# Patient Record
Sex: FEMALE | Race: WHITE | URBAN - METROPOLITAN AREA
[De-identification: names, ages, dates, MRNs, and addresses within clinical notes are randomized per-mention and may not be internally consistent; named-entity substitution may affect disease eponyms.]

---

## 2017-01-01 ENCOUNTER — APPOINTMENT (RX ONLY)
Dept: URBAN - METROPOLITAN AREA CLINIC 23 | Facility: CLINIC | Age: 62
Setting detail: DERMATOLOGY
End: 2017-01-01

## 2017-01-01 DIAGNOSIS — D485 NEOPLASM OF UNCERTAIN BEHAVIOR OF SKIN: ICD-10-CM

## 2017-01-01 DIAGNOSIS — L82.1 OTHER SEBORRHEIC KERATOSIS: ICD-10-CM

## 2017-01-01 DIAGNOSIS — L81.4 OTHER MELANIN HYPERPIGMENTATION: ICD-10-CM

## 2017-01-01 DIAGNOSIS — L98.8 OTHER SPECIFIED DISORDERS OF THE SKIN AND SUBCUTANEOUS TISSUE: ICD-10-CM

## 2017-01-01 DIAGNOSIS — Z85.828 PERSONAL HISTORY OF OTHER MALIGNANT NEOPLASM OF SKIN: ICD-10-CM

## 2017-01-01 PROCEDURE — ? BIOPSY BY PUNCH METHOD

## 2017-01-01 PROCEDURE — 17262 DSTRJ MAL LES T/A/L 1.1-2.0: CPT

## 2017-01-01 PROCEDURE — ? PRESCRIPTION

## 2017-01-01 PROCEDURE — ? CURETTAGE AND DESTRUCTION

## 2017-01-01 PROCEDURE — ? BIOPSY BY SHAVE METHOD

## 2017-01-01 PROCEDURE — 40490 BIOPSY OF LIP: CPT

## 2017-01-01 PROCEDURE — 99214 OFFICE O/P EST MOD 30 MIN: CPT | Mod: 25

## 2017-01-01 PROCEDURE — ? COUNSELING

## 2017-01-01 PROCEDURE — 11100: CPT

## 2017-01-01 PROCEDURE — ? DEFER

## 2017-01-01 RX ORDER — AMOXICILLIN 500 MG/1
CAPSULE ORAL
Qty: 8 | Refills: 0 | Status: ERX | COMMUNITY
Start: 2017-01-01

## 2017-01-01 RX ADMIN — AMOXICILLIN: 500 CAPSULE ORAL at 12:42

## 2017-01-01 ASSESSMENT — LOCATION DETAILED DESCRIPTION DERM
LOCATION DETAILED: LEFT DISTAL PRETIBIAL REGION
LOCATION DETAILED: RIGHT INFERIOR VERMILION LIP
LOCATION DETAILED: LEFT PROXIMAL DORSAL FOREARM
LOCATION DETAILED: RIGHT INFERIOR VERMILION LIP
LOCATION DETAILED: RIGHT POSTERIOR ANKLE
LOCATION DETAILED: RIGHT PROXIMAL RADIAL DORSAL FOREARM
LOCATION DETAILED: INFERIOR THORACIC SPINE
LOCATION DETAILED: RIGHT POSTERIOR SHOULDER
LOCATION DETAILED: RIGHT ANTERIOR PROXIMAL THIGH
LOCATION DETAILED: UPPER STERNUM
LOCATION DETAILED: LEFT DISTAL CALF
LOCATION DETAILED: LEFT POSTERIOR SHOULDER
LOCATION DETAILED: LEFT SUPERIOR UPPER BACK

## 2017-01-01 ASSESSMENT — LOCATION SIMPLE DESCRIPTION DERM
LOCATION SIMPLE: LEFT CALF
LOCATION SIMPLE: RIGHT SHOULDER
LOCATION SIMPLE: UPPER BACK
LOCATION SIMPLE: CHEST
LOCATION SIMPLE: RIGHT LIP
LOCATION SIMPLE: RIGHT FOREARM
LOCATION SIMPLE: RIGHT ANKLE
LOCATION SIMPLE: LEFT PRETIBIAL REGION
LOCATION SIMPLE: RIGHT THIGH
LOCATION SIMPLE: LEFT SHOULDER
LOCATION SIMPLE: RIGHT LIP
LOCATION SIMPLE: LEFT FOREARM
LOCATION SIMPLE: LEFT UPPER BACK

## 2017-01-01 ASSESSMENT — LOCATION ZONE DERM
LOCATION ZONE: ARM
LOCATION ZONE: LIP
LOCATION ZONE: TRUNK
LOCATION ZONE: LIP
LOCATION ZONE: LEG

## 2017-02-04 ENCOUNTER — HOSPITAL ENCOUNTER (INPATIENT)
Age: 62
LOS: 3 days | Discharge: HOME | DRG: 871 | End: 2017-02-07
Attending: EMERGENCY MEDICINE | Admitting: INTERNAL MEDICINE
Payer: COMMERCIAL

## 2017-02-04 ENCOUNTER — APPOINTMENT (OUTPATIENT)
Dept: GENERAL RADIOLOGY | Age: 62
DRG: 871 | End: 2017-02-04
Attending: INTERNAL MEDICINE
Payer: COMMERCIAL

## 2017-02-04 DIAGNOSIS — E86.0 DEHYDRATION: ICD-10-CM

## 2017-02-04 DIAGNOSIS — N10 ACUTE PYELONEPHRITIS: ICD-10-CM

## 2017-02-04 DIAGNOSIS — A41.9 SEPSIS, DUE TO UNSPECIFIED ORGANISM: Primary | ICD-10-CM

## 2017-02-04 DIAGNOSIS — N17.9 AKI (ACUTE KIDNEY INJURY) (HCC): ICD-10-CM

## 2017-02-04 PROBLEM — N39.0 SEPSIS DUE TO URINARY TRACT INFECTION (HCC): Status: ACTIVE | Noted: 2017-02-04

## 2017-02-04 LAB
ALBUMIN SERPL BCP-MCNC: 3 G/DL (ref 3.2–4.6)
ALBUMIN/GLOB SERPL: 0.7 {RATIO} (ref 1.2–3.5)
ALP SERPL-CCNC: 93 U/L (ref 50–136)
ALT SERPL-CCNC: 27 U/L (ref 12–65)
ANION GAP BLD CALC-SCNC: 8 MMOL/L (ref 7–16)
AST SERPL W P-5'-P-CCNC: 41 U/L (ref 15–37)
ATRIAL RATE: 65 BPM
BACTERIA URNS QL MICRO: ABNORMAL /HPF
BILIRUB SERPL-MCNC: 0.9 MG/DL (ref 0.2–1.1)
BNP SERPL-MCNC: 353 PG/ML
BUN SERPL-MCNC: 25 MG/DL (ref 8–23)
CALCIUM SERPL-MCNC: 9.6 MG/DL (ref 8.3–10.4)
CALCULATED P AXIS, ECG09: 36 DEGREES
CALCULATED R AXIS, ECG10: 63 DEGREES
CALCULATED T AXIS, ECG11: 76 DEGREES
CASTS URNS QL MICRO: ABNORMAL /LPF
CHLORIDE SERPL-SCNC: 102 MMOL/L (ref 98–107)
CO2 SERPL-SCNC: 28 MMOL/L (ref 21–32)
CREAT SERPL-MCNC: 1.47 MG/DL (ref 0.6–1)
DEPRECATED S PYO AG THROAT QL EIA: POSITIVE
DIAGNOSIS, 93000: NORMAL
DIASTOLIC BP, ECG02: NORMAL MMHG
DIFFERENTIAL METHOD BLD: ABNORMAL
EPI CELLS #/AREA URNS HPF: ABNORMAL /HPF
ERYTHROCYTE [DISTWIDTH] IN BLOOD BY AUTOMATED COUNT: 13.1 % (ref 11.9–14.6)
FLUAV AG NPH QL IA: NEGATIVE
FLUBV AG NPH QL IA: NEGATIVE
GLOBULIN SER CALC-MCNC: 4.3 G/DL (ref 2.3–3.5)
GLUCOSE SERPL-MCNC: 122 MG/DL (ref 65–100)
HCT VFR BLD AUTO: 37.4 % (ref 35.8–46.3)
HGB BLD-MCNC: 12.2 G/DL (ref 11.7–15.4)
LACTATE BLD-SCNC: 1.4 MMOL/L (ref 0.5–1.9)
LYMPHOCYTES # BLD: 0.3 K/UL (ref 0.5–4.6)
LYMPHOCYTES NFR BLD MANUAL: 3 % (ref 16–44)
MAGNESIUM SERPL-MCNC: 0.9 MG/DL (ref 1.8–2.4)
MCH RBC QN AUTO: 30.2 PG (ref 26.1–32.9)
MCHC RBC AUTO-ENTMCNC: 32.6 G/DL (ref 31.4–35)
MCV RBC AUTO: 92.6 FL (ref 79.6–97.8)
MONOCYTES # BLD: 0.9 K/UL (ref 0.1–1.3)
MONOCYTES NFR BLD MANUAL: 8 % (ref 3–9)
NEUTS BAND NFR BLD MANUAL: 3 % (ref 0–10)
NEUTS SEG # BLD: 9.5 K/UL (ref 1.7–8.2)
NEUTS SEG NFR BLD MANUAL: 86 % (ref 47–75)
P-R INTERVAL, ECG05: 216 MS
PLATELET # BLD AUTO: 150 K/UL (ref 150–450)
PLATELET COMMENTS,PCOM: ADEQUATE
PMV BLD AUTO: 10.4 FL (ref 10.8–14.1)
POTASSIUM SERPL-SCNC: 4.3 MMOL/L (ref 3.5–5.1)
PROT SERPL-MCNC: 7.3 G/DL (ref 6.3–8.2)
Q-T INTERVAL, ECG07: 414 MS
QRS DURATION, ECG06: 90 MS
QTC CALCULATION (BEZET), ECG08: 430 MS
RBC # BLD AUTO: 4.04 M/UL (ref 4.05–5.25)
RBC #/AREA URNS HPF: ABNORMAL /HPF
RBC MORPH BLD: ABNORMAL
SODIUM SERPL-SCNC: 138 MMOL/L (ref 136–145)
SYSTOLIC BP, ECG01: NORMAL MMHG
VENTRICULAR RATE, ECG03: 65 BPM
WBC # BLD AUTO: 10.7 K/UL (ref 4.3–11.1)
WBC URNS QL MICRO: >100 /HPF

## 2017-02-04 PROCEDURE — 93005 ELECTROCARDIOGRAM TRACING: CPT | Performed by: EMERGENCY MEDICINE

## 2017-02-04 PROCEDURE — 83605 ASSAY OF LACTIC ACID: CPT

## 2017-02-04 PROCEDURE — 83880 ASSAY OF NATRIURETIC PEPTIDE: CPT | Performed by: INTERNAL MEDICINE

## 2017-02-04 PROCEDURE — 74011000258 HC RX REV CODE- 258: Performed by: EMERGENCY MEDICINE

## 2017-02-04 PROCEDURE — 74011250637 HC RX REV CODE- 250/637: Performed by: EMERGENCY MEDICINE

## 2017-02-04 PROCEDURE — 74011250636 HC RX REV CODE- 250/636: Performed by: INTERNAL MEDICINE

## 2017-02-04 PROCEDURE — 83735 ASSAY OF MAGNESIUM: CPT | Performed by: INTERNAL MEDICINE

## 2017-02-04 PROCEDURE — 71010 XR CHEST SNGL V: CPT

## 2017-02-04 PROCEDURE — 77010033678 HC OXYGEN DAILY

## 2017-02-04 PROCEDURE — 94760 N-INVAS EAR/PLS OXIMETRY 1: CPT

## 2017-02-04 PROCEDURE — 93005 ELECTROCARDIOGRAM TRACING: CPT | Performed by: INTERNAL MEDICINE

## 2017-02-04 PROCEDURE — 94640 AIRWAY INHALATION TREATMENT: CPT

## 2017-02-04 PROCEDURE — 87186 SC STD MICRODIL/AGAR DIL: CPT | Performed by: EMERGENCY MEDICINE

## 2017-02-04 PROCEDURE — 65270000029 HC RM PRIVATE

## 2017-02-04 PROCEDURE — 80053 COMPREHEN METABOLIC PANEL: CPT | Performed by: EMERGENCY MEDICINE

## 2017-02-04 PROCEDURE — 87804 INFLUENZA ASSAY W/OPTIC: CPT | Performed by: INTERNAL MEDICINE

## 2017-02-04 PROCEDURE — 81003 URINALYSIS AUTO W/O SCOPE: CPT | Performed by: EMERGENCY MEDICINE

## 2017-02-04 PROCEDURE — 87086 URINE CULTURE/COLONY COUNT: CPT | Performed by: EMERGENCY MEDICINE

## 2017-02-04 PROCEDURE — 74011000250 HC RX REV CODE- 250: Performed by: INTERNAL MEDICINE

## 2017-02-04 PROCEDURE — 74011250637 HC RX REV CODE- 250/637: Performed by: INTERNAL MEDICINE

## 2017-02-04 PROCEDURE — 51701 INSERT BLADDER CATHETER: CPT | Performed by: EMERGENCY MEDICINE

## 2017-02-04 PROCEDURE — 74011250636 HC RX REV CODE- 250/636: Performed by: EMERGENCY MEDICINE

## 2017-02-04 PROCEDURE — 81015 MICROSCOPIC EXAM OF URINE: CPT | Performed by: EMERGENCY MEDICINE

## 2017-02-04 PROCEDURE — 99285 EMERGENCY DEPT VISIT HI MDM: CPT | Performed by: EMERGENCY MEDICINE

## 2017-02-04 PROCEDURE — 87088 URINE BACTERIA CULTURE: CPT | Performed by: EMERGENCY MEDICINE

## 2017-02-04 PROCEDURE — 87040 BLOOD CULTURE FOR BACTERIA: CPT | Performed by: EMERGENCY MEDICINE

## 2017-02-04 PROCEDURE — 85025 COMPLETE CBC W/AUTO DIFF WBC: CPT | Performed by: EMERGENCY MEDICINE

## 2017-02-04 PROCEDURE — 96365 THER/PROPH/DIAG IV INF INIT: CPT | Performed by: EMERGENCY MEDICINE

## 2017-02-04 PROCEDURE — 77030011943

## 2017-02-04 PROCEDURE — 87880 STREP A ASSAY W/OPTIC: CPT | Performed by: INTERNAL MEDICINE

## 2017-02-04 RX ORDER — MAGNESIUM SULFATE HEPTAHYDRATE 40 MG/ML
4 INJECTION, SOLUTION INTRAVENOUS ONCE
Status: COMPLETED | OUTPATIENT
Start: 2017-02-04 | End: 2017-02-04

## 2017-02-04 RX ORDER — LORAZEPAM 1 MG/1
1 TABLET ORAL
Status: DISCONTINUED | OUTPATIENT
Start: 2017-02-04 | End: 2017-02-07 | Stop reason: HOSPADM

## 2017-02-04 RX ORDER — SODIUM CHLORIDE 0.9 % (FLUSH) 0.9 %
5-10 SYRINGE (ML) INJECTION AS NEEDED
Status: DISCONTINUED | OUTPATIENT
Start: 2017-02-04 | End: 2017-02-07 | Stop reason: HOSPADM

## 2017-02-04 RX ORDER — HYDROCODONE BITARTRATE AND ACETAMINOPHEN 5; 325 MG/1; MG/1
1 TABLET ORAL
Status: DISCONTINUED | OUTPATIENT
Start: 2017-02-04 | End: 2017-02-05

## 2017-02-04 RX ORDER — SODIUM CHLORIDE 9 MG/ML
100 INJECTION, SOLUTION INTRAVENOUS CONTINUOUS
Status: DISCONTINUED | OUTPATIENT
Start: 2017-02-04 | End: 2017-02-04

## 2017-02-04 RX ORDER — PREGABALIN 100 MG/1
200 CAPSULE ORAL 3 TIMES DAILY
Status: DISCONTINUED | OUTPATIENT
Start: 2017-02-04 | End: 2017-02-04

## 2017-02-04 RX ORDER — SOTALOL HYDROCHLORIDE 80 MG/1
160 TABLET ORAL 2 TIMES DAILY
Status: DISCONTINUED | OUTPATIENT
Start: 2017-02-04 | End: 2017-02-07 | Stop reason: HOSPADM

## 2017-02-04 RX ORDER — ACETAMINOPHEN 650 MG/1
975 SUPPOSITORY RECTAL
Status: COMPLETED | OUTPATIENT
Start: 2017-02-04 | End: 2017-02-04

## 2017-02-04 RX ORDER — IPRATROPIUM BROMIDE AND ALBUTEROL SULFATE 2.5; .5 MG/3ML; MG/3ML
3 SOLUTION RESPIRATORY (INHALATION)
Status: DISCONTINUED | OUTPATIENT
Start: 2017-02-04 | End: 2017-02-07 | Stop reason: HOSPADM

## 2017-02-04 RX ORDER — ATORVASTATIN CALCIUM 10 MG/1
20 TABLET, FILM COATED ORAL
Status: DISCONTINUED | OUTPATIENT
Start: 2017-02-04 | End: 2017-02-07 | Stop reason: HOSPADM

## 2017-02-04 RX ORDER — IPRATROPIUM BROMIDE AND ALBUTEROL SULFATE 2.5; .5 MG/3ML; MG/3ML
3 SOLUTION RESPIRATORY (INHALATION)
Status: DISCONTINUED | OUTPATIENT
Start: 2017-02-04 | End: 2017-02-04

## 2017-02-04 RX ORDER — FUROSEMIDE 10 MG/ML
20 INJECTION INTRAMUSCULAR; INTRAVENOUS EVERY 12 HOURS
Status: DISCONTINUED | OUTPATIENT
Start: 2017-02-04 | End: 2017-02-04

## 2017-02-04 RX ORDER — LOSARTAN POTASSIUM 50 MG/1
100 TABLET ORAL DAILY
Status: DISCONTINUED | OUTPATIENT
Start: 2017-02-05 | End: 2017-02-07 | Stop reason: HOSPADM

## 2017-02-04 RX ORDER — DULOXETIN HYDROCHLORIDE 60 MG/1
60 CAPSULE, DELAYED RELEASE ORAL DAILY
Status: DISCONTINUED | OUTPATIENT
Start: 2017-02-05 | End: 2017-02-07 | Stop reason: HOSPADM

## 2017-02-04 RX ORDER — PREGABALIN 100 MG/1
200 CAPSULE ORAL 3 TIMES DAILY
Status: DISCONTINUED | OUTPATIENT
Start: 2017-02-05 | End: 2017-02-07 | Stop reason: HOSPADM

## 2017-02-04 RX ADMIN — CEFTRIAXONE 1 G: 1 INJECTION, POWDER, FOR SOLUTION INTRAMUSCULAR; INTRAVENOUS at 09:05

## 2017-02-04 RX ADMIN — SODIUM CHLORIDE 100 ML/HR: 900 INJECTION, SOLUTION INTRAVENOUS at 14:57

## 2017-02-04 RX ADMIN — LORAZEPAM 1 MG: 1 TABLET ORAL at 22:09

## 2017-02-04 RX ADMIN — MAGNESIUM SULFATE HEPTAHYDRATE 4 G: 40 INJECTION, SOLUTION INTRAVENOUS at 14:56

## 2017-02-04 RX ADMIN — SOTALOL HYDROCHLORIDE 160 MG: 80 TABLET ORAL at 17:57

## 2017-02-04 RX ADMIN — ACETAMINOPHEN 975 MG: 650 SUPPOSITORY RECTAL at 09:05

## 2017-02-04 RX ADMIN — SODIUM CHLORIDE 3417 ML: 900 INJECTION, SOLUTION INTRAVENOUS at 12:00

## 2017-02-04 RX ADMIN — HYDROCODONE BITARTRATE AND ACETAMINOPHEN 1 TABLET: 5; 325 TABLET ORAL at 20:42

## 2017-02-04 RX ADMIN — SODIUM CHLORIDE 3417 ML: 900 INJECTION, SOLUTION INTRAVENOUS at 09:05

## 2017-02-04 RX ADMIN — ATORVASTATIN CALCIUM 20 MG: 10 TABLET, FILM COATED ORAL at 22:09

## 2017-02-04 RX ADMIN — AZITHROMYCIN MONOHYDRATE 500 MG: 500 INJECTION, POWDER, LYOPHILIZED, FOR SOLUTION INTRAVENOUS at 18:10

## 2017-02-04 RX ADMIN — IPRATROPIUM BROMIDE AND ALBUTEROL SULFATE 3 ML: 2.5; .5 SOLUTION RESPIRATORY (INHALATION) at 23:15

## 2017-02-04 RX ADMIN — IPRATROPIUM BROMIDE AND ALBUTEROL SULFATE 3 ML: 2.5; .5 SOLUTION RESPIRATORY (INHALATION) at 20:29

## 2017-02-04 NOTE — ED NOTES
Attempted to give report, was told no nurses were available. Gino 97 page for code blue on 8th floor so I will call back.

## 2017-02-04 NOTE — H&P
HOSPITALIST HISTORY AND PHYSICAL  NAME:  Cristine Kennedy   Age:  57 y.o.  :   1955   MRN:   [id number]  PCP: Dave Ralph MD  Consulting MD:  Treatment Team: Attending Provider: Saniya Heart MD; Primary Nurse: Yessy Austin    REASON FOR ADMISSION: UTI, acute renal insufficiency, concern for sepsis, hypoxic resp failure    HPI:   55yr old female with a pmhx sig for HTN and recurrent utis. Has been feeling unwell since thursday with a sore throat. Felt better Friday so did not go to the doctor. Last night weak with nausea and decreased po intake. This morning family brought her to the er were she was noted to be febrile >101 and acute renal insufficiency also has a uti. Hospitalist asked to admit    Pt mobile and walks independently at baseline    Complete ROS done and is as stated in HPI or otherwise negative  Past Medical History   Diagnosis Date    Arrhythmia      Hx of A Fib, s/p ablation for treatment    Arthritis      osteo knees    Atrial fibrillation ([facility] [address] 4/15/2013     :Hx of successful atrial fibrillation and atrial flutter ablation on2011. Patient is chronic Sotalol therapy.  Autoimmune disease ([facility] [address]      fibrmyalgia    Chronic pain      d/t fibromyalgia    Dyspepsia 4/15/2013    Fibromyalgia 4/15/2013    Hyperlipidemia 4/15/2013    Hypertension      controlled by medication    Nausea & vomiting     Obesity      BMI=42    Obesity     Panic disorder 4/15/2013    Psychiatric disorder      anxiety controlled by medication      Past Surgical History   Procedure Laterality Date    Hx lap cholecystectomy      Hx tubal ligation      Hx tonsillectomy      Pr cardiac surg procedure unlist  2011     ablation    Hx knee arthroscopy       left meniscus tear repair      Prior to Admission Medications   Prescriptions Last Dose Informant Patient Reported? Taking?    Cholecalciferol, Vitamin D3, (VITAMIN D3) 1,000 unit cap   Yes No   Sig: Take by mouth. DULoxetine (CYMBALTA) 30 mg capsule   No No   Sig: TAKE THREE CAPSULES BY MOUTH DAILY   ESOMEPRAZOLE MAGNESIUM (NEXIUM PO)   Yes No   Sig: Take  by mouth. HYDROcodone-acetaminophen (NORCO) 7.5-325 mg per tablet   Yes No   Sig: Take 1 Tab by mouth every six (6) hours as needed. Per anesthesia protocol:instructed to take am of surgery if needed   LORazepam (ATIVAN) 1 mg tablet   No No   Sig: Take 1 Tab by mouth every eight (8) hours as needed for Anxiety. atorvastatin (LIPITOR) 20 mg tablet   No No   Sig: Take 1 Tab by mouth nightly. cyanocobalamin (VITAMIN B-12) 1,000 mcg Subl   Yes No   Si Tab by SubLINGual route every morning. losartan (COZAAR) 100 mg tablet   No No   Sig: Take 1 Tab by mouth daily. meloxicam (MOBIC) 15 mg tablet   No No   Sig: TAKE 1 TABLET EVERY DAY   omega-3 fatty acids-vitamin e (FISH OIL) 1,000 mg cap   Yes No   Sig: Take 1 Cap by mouth.   pregabalin (LYRICA) 200 mg capsule   No No   Sig: Take 1 Cap by mouth three (3) times daily. Max Daily Amount: 600 mg.   sotalol (BETAPACE) 160 mg tablet   No No   Sig: Take 1 Tab by mouth two (2) times a day. tapentadol (NUCYNTA) 100 mg tablet   Yes No   Sig: Take  by mouth.       Facility-Administered Medications: None     Allergies   Allergen Reactions    Ace Inhibitors Angioedema    Augmentin [Amoxicillin-Pot Clavulanate] Diarrhea and Nausea Only    Reglan [Metoclopramide] Anxiety    Trilipix [Fenofibric Acid (Choline)] Not Reported This Time      Social History   Substance Use Topics    Smoking status: Never Smoker    Smokeless tobacco: Never Used    Alcohol use No      Family History   Problem Relation Age of Onset    Heart Disease Mother     Cancer Father      prostate    Cancer Brother      brain tumor    Cancer Brother      brain tumor    Cancer Brother      pancreatic    Cancer Sister      breast    Breast Cancer Sister 79      Objective:     Visit Vitals    /70    Pulse 73    Temp 99.4 °F (37.4 °C)  Resp 17    Ht 5' 6\" (1.676 m)    Wt 113.9 kg (251 lb)    SpO2 96%    BMI 40.51 kg/m2      Temp (24hrs), Av.5 °F (38.6 °C), Min:99.4 °F (37.4 °C), Max:103.7 °F (39.8 °C)    Oxygen Therapy  O2 Sat (%): 96 % (17)  O2 Device: Nasal cannula (17)  O2 Flow Rate (L/min): 4 l/min (17)  Physical Exam:  General:    Lethargic, drowsy and weak,    Head:   Normocephalic, without obvious abnormality, atraumatic. Nose:  Nares normal. No drainage or sinus tenderness. Lungs:   Bilateral crackles but pt to weak to accommodate the full exam  Heart:   Regular rate and rhythm,  no murmur, rub or gallop. Abdomen:   Soft, non-tender. Not distended. Bowel sounds normal.   Extremities: No cyanosis. No edema. No clubbing  Skin:     Texture, turgor normal. No rashes or lesions. Not Jaundiced  Neurologic: Lethargic but arousable &  oriented x 3, no focal deficits   Data Review: personally by me   Recent Results (from the past 24 hour(s))   CBC WITH AUTOMATED DIFF    Collection Time: 17  8:22 AM   Result Value Ref Range    WBC 10.7 4.3 - 11.1 K/uL    RBC 4.04 (L) 4.05 - 5.25 M/uL    HGB 12.2 11.7 - 15.4 g/dL    HCT 37.4 35.8 - 46.3 %    MCV 92.6 79.6 - 97.8 FL    MCH 30.2 26.1 - 32.9 PG    MCHC 32.6 31.4 - 35.0 g/dL    RDW 13.1 11.9 - 14.6 %    PLATELET 346 720 - 919 K/uL    MPV 10.4 (L) 10.8 - 14.1 FL    NEUTROPHILS 86 (H) 47 - 75 %    BAND NEUTROPHILS 3 0 - 10 %    LYMPHOCYTES 3 (L) 16 - 44 %    MONOCYTES 8 3 - 9 %    ABS. NEUTROPHILS 9.5 (H) 1.7 - 8.2 K/UL    ABS. LYMPHOCYTES 0.3 (L) 0.5 - 4.6 K/UL    ABS.  MONOCYTES 0.9 0.1 - 1.3 K/UL    RBC COMMENTS NORMOCYTIC/NORMOCHROMIC      PLATELET COMMENTS ADEQUATE      DF MANUAL     METABOLIC PANEL, COMPREHENSIVE    Collection Time: 17  8:22 AM   Result Value Ref Range    Sodium 138 136 - 145 mmol/L    Potassium 4.3 3.5 - 5.1 mmol/L    Chloride 102 98 - 107 mmol/L    CO2 28 21 - 32 mmol/L    Anion gap 8 7 - 16 mmol/L    Glucose 122 (H) 65 - 100 mg/dL    BUN 25 (H) 8 - 23 MG/DL    Creatinine 1.47 (H) 0.6 - 1.0 MG/DL    GFR est AA 46 (L) >60 ml/min/1.73m2    GFR est non-AA 38 (L) >60 ml/min/1.73m2    Calcium 9.6 8.3 - 10.4 MG/DL    Bilirubin, total 0.9 0.2 - 1.1 MG/DL    ALT (SGPT) 27 12 - 65 U/L    AST (SGOT) 41 (H) 15 - 37 U/L    Alk. phosphatase 93 50 - 136 U/L    Protein, total 7.3 6.3 - 8.2 g/dL    Albumin 3.0 (L) 3.2 - 4.6 g/dL    Globulin 4.3 (H) 2.3 - 3.5 g/dL    A-G Ratio 0.7 (L) 1.2 - 3.5     POC LACTIC ACID    Collection Time: 02/04/17  8:25 AM   Result Value Ref Range    Lactic Acid (POC) 1.4 0.5 - 1.9 mmol/L   URINE MICROSCOPIC    Collection Time: 02/04/17  8:46 AM   Result Value Ref Range    WBC >100 (H) 0 /hpf    RBC 0-3 0 /hpf    Epithelial cells 0-3 0 /hpf    Bacteria 4+ (H) 0 /hpf    Casts 0-3 0 /lpf     Imaging /Procedures /Studies reviewed personally by me  XR Results (most recent):  No results found for this or any previous visit. CT Scan      VAS/US Results (most recent):  No results found for this or any previous visit. Assessment and Plan: Active Hospital Problems    Diagnosis Date Noted    Sepsis due to urinary tract infection ([facility] [address] 02/04/2017       PLAN  ·  Concern for sepsis - pt clinically dehydrated with temp of >101, uti, resp failure and acute renal insufficiency - will give supportive care as per sepsis protocol  · UTI- continue abx started in er and follow up cultures  · Sore throat- concern for strep- will swab  · Hypoxic resp failure- pt not on home O2 on 4l here will continue and try to wean down   · cxr - ordered by er but pending to evaluate for PNA  · HTN- monitor BP and give prn meds as needed.    · Further mgt as her clinical course dictates  · Pt high risk given resp. failure, uti and concern for sepsis    Code Status:   full  Anticipated discharge:   2-3 days      Signed By: Cj Rain MD     February 4, 2017      Pt allowed me to sit her up- wheezing bliaterally  cxr shows multifocal pneumonia

## 2017-02-04 NOTE — PROGRESS NOTES
Pt arrived to the floor alert and oriented x 3, pt very drowsy but awakes to name and touch, resp even and unlabored pt lung sounds with crackles doctor made aware , skin dry and intact. will continue to monitor.

## 2017-02-04 NOTE — ED PROVIDER NOTES
HPI Comments: Had cough and nasal congestion for several days worsening Thursday,     mumbling speech and decreased responsiveness started last night    Patient is a 57 y.o. female presenting with altered mental status. The history is provided by the spouse and a relative. Altered mental status    This is a new problem. The current episode started 12 to 24 hours ago. The problem has been gradually worsening. Associated symptoms include confusion and somnolence. Mental status baseline is normal.  Risk factors include a recent illness. Past medical history comments: frequent UTIs. Past Medical History:   Diagnosis Date    Arrhythmia 2009     Hx of A Fib, s/p ablation for treatment    Arthritis      osteo knees    Atrial fibrillation ([facility] [address] 4/15/2013     :Hx of successful atrial fibrillation and atrial flutter ablation on2-7-2011. Patient is chronic Sotalol therapy.     Autoimmune disease ([facility] [address]      fibrmyalgia    Chronic pain      d/t fibromyalgia    Dyspepsia 4/15/2013    Fibromyalgia 4/15/2013    Hyperlipidemia 4/15/2013    Hypertension      controlled by medication    Nausea & vomiting     Obesity      BMI=42    Obesity     Panic disorder 4/15/2013    Psychiatric disorder      anxiety controlled by medication       Past Surgical History:   Procedure Laterality Date    Hx lap cholecystectomy  2003    Hx tubal ligation  1988    Hx tonsillectomy  1976    Pr cardiac surg procedure unlist  2/8/2011     ablation    Hx knee arthroscopy  2008     left meniscus tear repair         Family History:   Problem Relation Age of Onset    Heart Disease Mother     Cancer Father      prostate    Cancer Brother      brain tumor    Cancer Brother      brain tumor    Cancer Brother      pancreatic    Cancer Sister      breast    Breast Cancer Sister 79       Social History     Social History    Marital status:      Spouse name: N/A    Number of children: N/A    Years of education: N/A Occupational History    Not on file. Social History Main Topics    Smoking status: Never Smoker    Smokeless tobacco: Never Used    Alcohol use No    Drug use: No    Sexual activity: Not on file     Other Topics Concern    Not on file     Social History Narrative         ALLERGIES: Ace inhibitors; Augmentin [amoxicillin-pot clavulanate]; Reglan [metoclopramide]; and Trilipix [fenofibric acid (choline)]    Review of Systems   Unable to perform ROS: Mental status change   Constitutional: Positive for activity change, appetite change, fatigue and fever. Negative for chills. HENT: Positive for congestion. Negative for rhinorrhea and sore throat. Eyes: Negative for discharge and redness. Respiratory: Positive for cough. Negative for shortness of breath. Cardiovascular: Negative for chest pain. Gastrointestinal: Positive for abdominal pain. Negative for diarrhea, nausea and vomiting. Musculoskeletal: Negative for arthralgias and back pain. Skin: Negative for rash. Neurological: Negative for dizziness and headaches. Psychiatric/Behavioral: Positive for confusion. Vitals:    02/04/17 0819 02/04/17 0855   BP: 155/72    Pulse: 82    Resp: 20    Temp: (!) 101.4 °F (38.6 °C) (!) 103.7 °F (39.8 °C)   SpO2: (!) 86%    Weight: 113.9 kg (251 lb)    Height: 5' 6\" (1.676 m)             Physical Exam   Constitutional: She appears well-developed and well-nourished. She appears distressed. Mumbles yes/no answers, to questions   HENT:   Head: Normocephalic and atraumatic. Eyes: Conjunctivae are normal. Pupils are equal, round, and reactive to light. Right eye exhibits no discharge. Left eye exhibits no discharge. No scleral icterus. Neck: Normal range of motion. Neck supple. Cardiovascular: Normal rate, regular rhythm and normal heart sounds. Exam reveals no gallop. No murmur heard. Pulmonary/Chest: Effort normal and breath sounds normal. No respiratory distress. She has no wheezes. She has no rales. Abdominal: Soft. Bowel sounds are normal. There is no tenderness. There is no guarding. Musculoskeletal: Normal range of motion. She exhibits no edema. Neurological: She is alert. She exhibits normal muscle tone. cni 2-12 grossly   Skin: Skin is warm and dry. She is not diaphoretic. Psychiatric: She has a normal mood and affect. Her behavior is normal.   Nursing note and vitals reviewed. MDM  Number of Diagnoses or Management Options  Acute pyelonephritis: new and requires workup  ESTELLE (acute kidney injury) ([facility] [address]): new and requires workup  Dehydration:   Sepsis, due to unspecified organism [facility]):   Diagnosis management comments: Medical decision making note: Altered mental status with fever, patient has UTI). Lactic acid and white count are okay,    Catheter urine shows nitrite positive, greater than 100 white cells and 4+ bacteria  This concludes the \"medical decision making note\" part of this emergency department visit note.          Amount and/or Complexity of Data Reviewed  Tests in the medicine section of CPT®: ordered and reviewed (Results Include:    Recent Results (from the past 24 hour(s))  -CBC WITH AUTOMATED DIFF  Collection Time: 02/04/17  8:22 AM       Result                                            Value                         Ref Range                       WBC                                               10.7                          4.3 - 11.1 K/uL                 RBC                                               4.04 (L)                      4.05 - 5.25 M/uL                HGB                                               12.2                          11.7 - 15.4 g/dL                HCT                                               37.4                          35.8 - 46.3 %                   MCV                                               92.6                          79.6 - 97.8 FL                  MCH                                               30.2 26.1 - 32.9 PG                  MCHC                                              32.6                          31.4 - 35.0 g/dL                RDW                                               13.1                          11.9 - 14.6 %                   PLATELET                                          150                           150 - 450 K/uL                  MPV                                               10.4 (L)                      10.8 - 14.1 FL                  NEUTROPHILS                                       86 (H)                        47 - 75 %                       BAND NEUTROPHILS                                  3                             0 - 10 %                        LYMPHOCYTES                                       3 (L)                         16 - 44 %                       MONOCYTES                                         8                             3 - 9 %                         ABS. NEUTROPHILS                                  9.5 (H)                       1.7 - 8.2 K/UL                  ABS.  LYMPHOCYTES                                  0.3 (L)                       0.5 - 4.6 K/UL                  ABS. MONOCYTES                                    0.9                           0.1 - 1.3 K/UL                  RBC COMMENTS                                      NORMOCYTIC/NORMOCHROMIC                                       PLATELET COMMENTS                                 ADEQUATE                                                      DF                                                MANUAL                                                   -METABOLIC PANEL, COMPREHENSIVE  Collection Time: 02/04/17  8:22 AM       Result                                            Value                         Ref Range                       Sodium                                            138                           136 - 145 mmol/L                Potassium 4.3                           3.5 - 5.1 mmol/L                Chloride                                          102                           98 - 107 mmol/L                 CO2                                               28                            21 - 32 mmol/L                  Anion gap                                         8                             7 - 16 mmol/L                   Glucose                                           122 (H)                       65 - 100 mg/dL                  BUN                                               25 (H)                        8 - 23 MG/DL                    Creatinine                                        1.47 (H)                      0.6 - 1.0 MG/DL                 GFR est AA                                        46 (L)                        >60 ml/min/1.73m2               GFR est non-AA                                    38 (L)                        >60 ml/min/1.73m2               Calcium                                           9.6                           8.3 - 10.4 MG/DL                Bilirubin, total                                  0.9                           0.2 - 1.1 MG/DL                 ALT (SGPT)                                        27                            12 - 65 U/L                     AST (SGOT)                                        41 (H)                        15 - 37 U/L                     Alk. phosphatase                                  93                            50 - 136 U/L                    Protein, total                                    7.3                           6.3 - 8.2 g/dL                  Albumin                                           3.0 (L)                       3.2 - 4.6 g/dL                  Globulin                                          4.3 (H)                       2.3 - 3.5 g/dL                  A-G Ratio                                         0.7 (L) 1.2 - 3.5                  -POC LACTIC ACID  Collection Time: 02/04/17  8:25 AM       Result                                            Value                         Ref Range                       Lactic Acid (POC)                                 1.4                           0.5 - 1.9 mmol/L           -URINE MICROSCOPIC  Collection Time: 02/04/17  8:46 AM       Result                                            Value                         Ref Range                       WBC                                               >100 (H)                      0 /hpf                          RBC                                               0-3                           0 /hpf                          Epithelial cells                                  0-3                           0 /hpf                          Bacteria                                          4+ (H)                        0 /hpf                          Casts                                             0-3                           0 /lpf                     )      ED Course       Procedures

## 2017-02-04 NOTE — IP AVS SNAPSHOT
Fantasma Beyercamilas 
 
 
 [address] [facility] [address] [facility] 
[phone] Patient: Ita Lewis MRN: [id number] You are allergic to the following Allergen Reactions Ace Inhibitors Angioedema Augmentin (Amoxicillin-Pot Clavulanate) Diarrhea Nausea Only Reglan (Metoclopramide) Anxiety Trilipix (Fenofibric Acid (Choline)) Not Reported This Time Immunizations Administered for This Admission Name Date  
 TB Skin Test (PPD) Intradermal  Deferred () Recent Documentation Height Weight Breastfeeding? BMI OB Status Smoking Status 1.676 m 114.9 kg No 40.87 kg/m2 Postmenopausal Never Smoker Unresulted Labs Order Current Status CBC WITH AUTOMATED DIFF Preliminary result CULTURE, BLOOD Preliminary result CULTURE, BLOOD Preliminary result Emergency Contacts Name Discharge Info Relation Home Work Mobile Bernardo Kirby  Spouse [3] [phone] About your hospitalization You were admitted on:  February 4, 2017 You last received care in the:  [facility] 8 MED SURG You were discharged on:  February 7, 2017 Unit phone number:  [phone] Why you were hospitalized Your primary diagnosis was:  Sepsis Due To Urinary Tract Infection (Hcc) Your diagnoses also included:  Pneumonia Involving Left Lung, Renal Insufficiency, Strep Throat, Acute Respiratory Failure With Hypoxia (Hcc) Providers Seen During Your Hospitalizations Provider Role Specialty Primary office phone Brooks Childs MD Attending Provider Emergency Medicine [phone] Trent Castaneda MD Attending Provider Internal Medicine [phone] Your Primary Care Physician (PCP) Primary Care Physician Office Phone Office Fax Amos PETERS [phone] ** None ** Follow-up Information Follow up With Details Comments Contact Info Bert Nageotte, MD Go on 2/23/2017 2:30pm Manuel Guevara 
[phone] Your Appointments Thursday February 23, 2017  2:30 PM EST Extended Office Visit with Bert Nageotte, MD  
[facility] ([address] [facility]) [place] [phone]  
[phone] Current Discharge Medication List  
  
START taking these medications Dose & Instructions Dispensing Information Comments Morning Noon Evening Bedtime  
 azithromycin 500 mg Tab Commonly known as:  Patricia Blackbird Your next dose is:  Tomorrow Dose:  500 mg Take 1 Tab by mouth daily for 5 days. Quantity:  5 Tab Refills:  0  
     
   
   
   
  
 cefpodoxime 200 mg tablet Commonly known as:  Chauncey [place] Your next dose is: Today Dose:  200 mg Take 1 Tab by mouth two (2) times a day for 7 days. Quantity:  14 Tab Refills:  0 CONTINUE these medications which have CHANGED Dose & Instructions Dispensing Information Comments Morning Noon Evening Bedtime DULoxetine 30 mg capsule Commonly known as:  CYMBALTA What changed:   
- how much to take - when to take this 
- additional instructions Your next dose is:  Tomorrow TAKE THREE CAPSULES BY MOUTH DAILY Quantity:  90 Cap Refills:  6 CONTINUE these medications which have NOT CHANGED Dose & Instructions Dispensing Information Comments Morning Noon Evening Bedtime  
 atorvastatin 20 mg tablet Commonly known as:  LIPITOR Your next dose is: Today Dose:  20 mg Take 1 Tab by mouth nightly. Quantity:  30 Tab Refills:  3 FISH OIL 1,000 mg Cap Generic drug:  omega-3 fatty acids-vitamin e Your next dose is:  Tomorrow Dose:  1 Cap Take 1 Cap by mouth. Refills:  0 HYDROcodone-acetaminophen 7.5-325 mg per tablet Commonly known as:  Cheyenne Mackenzie Dose:  1 Tab Take 1 Tab by mouth every eight (8) hours as needed. Per anesthesia protocol:instructed to take am of surgery if needed Refills:  0 LORazepam 1 mg tablet Commonly known as:  ATIVAN Dose:  1 mg Take 1 Tab by mouth every eight (8) hours as needed for Anxiety. Quantity:  180 Tab Refills:  1  
     
   
   
   
  
 losartan 100 mg tablet Commonly known as:  COZAAR Your next dose is:  Tomorrow Dose:  100 mg Take 1 Tab by mouth daily. Quantity:  90 Tab Refills:  3 NEXIUM PO Your next dose is:  Tomorrow Take  by mouth. Refills:  0  
     
   
   
   
  
 NUCYNTA 100 mg tablet Generic drug:  tapentadol Take  by mouth. Refills:  0  
     
   
   
   
  
 pregabalin 200 mg capsule Commonly known as:  Abby Hamilton Your next dose is: Today Dose:  200 mg Take 1 Cap by mouth three (3) times daily. Max Daily Amount: 600 mg. Quantity:  90 Cap Refills:  5  
     
   
   
  
   
  
 sotalol 160 mg tablet Commonly known as:  Vernadine Oliva Your next dose is: Today Dose:  160 mg Take 1 Tab by mouth two (2) times a day. Quantity:  180 Tab Refills:  1 VITAMIN B-12 1,000 mcg sublingual tablet Generic drug:  cyanocobalamin Your next dose is:  Tomorrow Dose:  1 Tab 1 Tab by SubLINGual route every morning. Refills:  0  
     
   
   
   
  
 VITAMIN D3 1,000 unit Cap Generic drug:  cholecalciferol Your next dose is:  Tomorrow Take  by mouth. Refills:  0 STOP taking these medications   
 meloxicam 15 mg tablet Commonly known as:  MOBIC Where to Get Your Medications Information on where to get these meds will be given to you by the nurse or doctor. ! Ask your nurse or doctor about these medications azithromycin 500 mg Tab  
 cefpodoxime 200 mg tablet Discharge Instructions Oxygen Therapy: Care Instructions Your Care Instructions Oxygen therapy helps you get more oxygen into your lungs and bloodstream. You may use it if you have a disease that makes it hard to breathe, such as COPD, pulmonary fibrosis (scarring of the lungs), or heart failure. Oxygen therapy can make it easier for you to breathe and can reduce your heart's workload. Some people need extra oxygen all the time. Others need it from time to time throughout the day or overnight. A doctor will prescribe how much oxygen you need and how often to use it. To breathe the oxygen, most people use a nasal cannula (say \"ALEXANDER-yuh-cong\"). This is a thin tube with two prongs that fit just inside your nose. People who need a lot of oxygen may need to use a mask that fits over the nose and mouth. Follow-up care is a key part of your treatment and safety. Be sure to make and go to all appointments, and call your doctor if you are having problems. It's also a good idea to know your test results and keep a list of the medicines you take. How can you care for yourself at home? To help yourself · Using oxygen may dry out your nose or lips. Use water-based lubricants on your lips or nostrils. Do not use an oil-based product like petroleum jelly. · If you use a nasal cannula, the tubing may rub under your nostrils and around your ears. To keep your skin from getting sore, tuck some gauze under the tubing. Use a water-based lotion on rubbed areas. · Do not use alcohol or take drugs that relax you, because they will slow your breathing rate. · Keep track of how much oxygen is in the tank, and reorder before it runs out. If a holiday is coming up or you expect bad weather, order in advance or make your regular order larger. · You may need extra oxygen when you travel to high altitudes or travel by plane. Ask your doctor about this. · If you are getting oxygen directly to your windpipe through an opening in your neck, your doctor will teach you how to care for the equipment. To make sure oxygen is flowing · Check the flow by holding your mask or cannula up to your ear and listening for the \"hiss\" of airflow. · If you have a nasal cannula, dip the prongs in a glass of water. If you see bubbles, oxygen is coming through. · Check your pressure gauge or contents indicator. · If you use an oxygen concentrator, make sure it is turned on and plugged in. If you use a cylinder, make sure the valve is open. · Look for kinks, blockages, or water in the tubing. Be sure the tubing is connected to the oxygen source. · Do not change your oxygen flow rate. Your doctor sets this at the correct level. Higher flow rates usually do not help and can increase the risk of harmful carbon dioxide buildup in the blood. To be safe · Do not leave cords or tubing running across an area where you or someone else may trip on it. · Do not let oxygen containers get hot. Store them in a cool place where there is airflow. Do not leave them in a car trunk or a hot vehicle. · Keep oxygen containers upright. Make sure they do not fall over and get damaged. Try securing the tanks in a sturdy container or securing them with a rope or a chain. · Watch for signs of oxygen leaks. If you hear a loud hissing from your container or if it empties too fast, stay away from the container. Open windows right away and call the company that brought the oxygen system to your home. · Do not use oxygen around anything that could spark or easily cause a fire. ¨ Do not smoke or let others smoke while you are using oxygen. Put up \"no smoking\" signs in your home. ¨ Do not use oxygen near open flames, such as candles, fireplaces, gas stoves, or hot water heaters. Do not use it near electric razors, hair dryers, heating pads, or anything that may spark. ¨ Keep a working fire extinguisher in your home where it is easy to get to. ¨ If a fire starts, turn off the oxygen right away and leave the house. ¨ If you have an oxygen concentrator, do not use it if the cord looks damaged. Do not use an extension cord to plug it in. Do not plug it into an outlet that has other appliances plugged into it. To care for the equipment · Follow the directions that come with the equipment for using and caring for it. · Wash your cannula or mask with a liquid soap and warm water 1 or 2 times a week. Replace them every 2 to 4 weeks. · If you have a cold, change the nasal prongs when your cold symptoms are done. · If you have an oxygen concentrator, unplug the unit and wipe down the cabinet with a damp cloth daily. Clean the air filter at least 2 times a week. Where can you learn more? Go to http://shaheenMobileReactorthierno.info/. Enter E117 in the search box to learn more about \"Oxygen Therapy: Care Instructions. \" Current as of: May 23, 2016 Content Version: 11.1 © 2053-1782 [employer]. Care instructions adapted under license by [employer] (which disclaims liability or warranty for this information). If you have questions about a medical condition or this instruction, always ask your healthcare professional. [address] any warranty or liability for your use of this information. Pneumonia: Care Instructions Your Care Instructions Pneumonia is an infection of the lungs. Most cases are caused by infections from bacteria or viruses. Pneumonia may be mild or very severe. If it is caused by bacteria, you will be treated with antibiotics. It may take a few weeks to a few months to recover fully from pneumonia, depending on how sick you were and whether your overall health is good. Follow-up care is a key part of your treatment and safety.  Be sure to make and go to all appointments, and call your doctor if you are having problems. Its also a good idea to know your test results and keep a list of the medicines you take. How can you care for yourself at home? · Take your antibiotics exactly as directed. Do not stop taking the medicine just because you are feeling better. You need to take the full course of antibiotics. · Take your medicines exactly as prescribed. Call your doctor if you think you are having a problem with your medicine. · Get plenty of rest and sleep. You may feel weak and tired for a while, but your energy level will improve with time. · To prevent dehydration, drink plenty of fluids, enough so that your urine is light yellow or clear like water. Choose water and other caffeine-free clear liquids until you feel better. If you have kidney, heart, or liver disease and have to limit fluids, talk with your doctor before you increase the amount of fluids you drink. · Take care of your cough so you can rest. A cough that brings up mucus from your lungs is common with pneumonia. It is one way your body gets rid of the infection. But if coughing keeps you from resting or causes severe fatigue and chest-wall pain, talk to your doctor. He or she may suggest that you take a medicine to reduce the cough. · Use a vaporizer or humidifier to add moisture to your bedroom. Follow the directions for cleaning the machine. · Do not smoke or allow others to smoke around you. Smoke will make your cough last longer. If you need help quitting, talk to your doctor about stop-smoking programs and medicines. These can increase your chances of quitting for good. · Take an over-the-counter pain medicine, such as acetaminophen (Tylenol), ibuprofen (Advil, Motrin), or naproxen (Aleve). Read and follow all instructions on the label. · Do not take two or more pain medicines at the same time unless the doctor told you to.  Many pain medicines have acetaminophen, which is Tylenol. Too much acetaminophen (Tylenol) can be harmful. · If you were given a spirometer to measure how well your lungs are working, use it as instructed. This can help your doctor tell how your recovery is going. · To prevent pneumonia in the future, talk to your doctor about getting a flu vaccine (once a year) and a pneumococcal vaccine (one time only for most people). When should you call for help? Call 911 anytime you think you may need emergency care. For example, call if: 
· You have severe trouble breathing. Call your doctor now or seek immediate medical care if: 
· You cough up dark brown or bloody mucus (sputum). · You have new or worse trouble breathing. · You are dizzy or lightheaded, or you feel like you may faint. Watch closely for changes in your health, and be sure to contact your doctor if: 
· You have a new or higher fever. · You are coughing more deeply or more often. · You are not getting better after 2 days (48 hours). · You do not get better as expected. Where can you learn more? Go to http://shaheen-thierno.info/. Enter [id number] in the search box to learn more about \"Pneumonia: Care Instructions. \" Current as of: May 23, 2016 Content Version: 11.1 © 6270-1637 [employer]tank[employer]. Care instructions adapted under license by [employer] (which disclaims liability or warranty for this information). If you have questions about a medical condition or this instruction, always ask your healthcare professional. [place] [address] any warranty or liability for your use of this information. Sepsis: Care Instructions Your Care Instructions Sepsis is an infection that has spread throughout your body. It is a life-threatening condition and often causes extremely low blood pressure. This can lead to problems with many different organs.  
The cause of sepsis is not always clear, but it can happen as part of a long-term or sudden illness. Sometimes even a mild illness can lead to sepsis. Follow-up care is a key part of your treatment and safety. Be sure to make and go to all appointments, and call your doctor if you are having problems. Its also a good idea to know your test results and keep a list of the medicines you take. How can you care for yourself at home? · If your doctor prescribed antibiotics, take them as directed. Do not stop taking them just because you feel better. You need to take the full course of antibiotics. · Drink plenty of fluids, enough so that your urine is light yellow or clear like water. Choose water or caffeine-free clear liquids until you feel better. If you have kidney, heart, or liver disease and have to limit fluids, talk with your doctor before you increase your fluid intake. You can try rehydration drinks, such as Gatorade or Powerade. · Do not drink alcohol. · Eat a healthy diet. Include fruits, vegetables, and whole grains in your diet every day. · Walking is an easy way to get exercise. Gradually increase the amount you walk every day. Make sure your doctor knows that you are starting an exercise program. 
· Do not smoke or use other tobacco products. If you need help quitting, talk to your doctor about stop-smoking programs and medicines. These can increase your chances of quitting for good. When should you call for help? Call 911 anytime you think you may need emergency care. For example, call if: 
· You passed out (lost consciousness). Call your doctor now or seek immediate medical care if: 
· You have a fever or chills. · You have cool, pale, or clammy skin. · You are dizzy or lightheaded, or you feel like you may faint. · You have any new symptoms, such as a cough, pain in one part of your body, or urinary problems. Watch closely for changes in your health, and be sure to contact your doctor if: 
· You do not get better as expected. Where can you learn more? Go to http://shaheen-thierno.info/. Enter F965 in the search box to learn more about \"Sepsis: Care Instructions. \" Current as of: May 27, 2016 Content Version: 11.1 © 2006-2016 [employer]. Care instructions adapted under license by [employer] (which disclaims liability or warranty for this information). If you have questions about a medical condition or this instruction, always ask your healthcare professional. [place] [address] any warranty or liability for your use of this information. Urinary Tract Infection in Women: Care Instructions Your Care Instructions A urinary tract infection, or UTI, is a general term for an infection anywhere between the kidneys and the urethra (where urine comes out). Most UTIs are bladder infections. They often cause pain or burning when you urinate. UTIs are caused by bacteria and can be cured with antibiotics. Be sure to complete your treatment so that the infection goes away. Follow-up care is a key part of your treatment and safety. Be sure to make and go to all appointments, and call your doctor if you are having problems. It's also a good idea to know your test results and keep a list of the medicines you take. How can you care for yourself at home? · Take your antibiotics as directed. Do not stop taking them just because you feel better. You need to take the full course of antibiotics. · Drink extra water and other fluids for the next day or two. This may help wash out the bacteria that are causing the infection. (If you have kidney, heart, or liver disease and have to limit fluids, talk with your doctor before you increase your fluid intake.) · Avoid drinks that are carbonated or have caffeine. They can irritate the bladder. · Urinate often. Try to empty your bladder each time.  
· To relieve pain, take a hot bath or lay a heating pad set on low over your lower belly or genital area. Never go to sleep with a heating pad in place. To prevent UTIs · Drink plenty of water each day. This helps you urinate often, which clears bacteria from your system. (If you have kidney, heart, or liver disease and have to limit fluids, talk with your doctor before you increase your fluid intake.) · Consider adding cranberry juice to your diet. · Urinate when you need to. · Urinate right after you have sex. · Change sanitary pads often. · Avoid douches, bubble baths, feminine hygiene sprays, and other feminine hygiene products that have deodorants. · After going to the bathroom, wipe from front to back. When should you call for help? Call your doctor now or seek immediate medical care if: · Symptoms such as fever, chills, nausea, or vomiting get worse or appear for the first time. · You have new pain in your back just below your rib cage. This is called flank pain. · There is new blood or pus in your urine. · You have any problems with your antibiotic medicine. Watch closely for changes in your health, and be sure to contact your doctor if: 
· You are not getting better after taking an antibiotic for 2 days. · Your symptoms go away but then come back. Where can you learn more? Go to http://shaheen-thierno.info/. Enter G936 in the search box to learn more about \"Urinary Tract Infection in Women: Care Instructions. \" Current as of: August 12, 2016 Content Version: 11.1 © 1840-3616 [employer]. Care instructions adapted under license by [employer] (which disclaims liability or warranty for this information). If you have questions about a medical condition or this instruction, always ask your healthcare professional. [place] [address] any warranty or liability for your use of this information. DISCHARGE SUMMARY from Nurse The following personal items are in your possession at time of discharge: Dental Appliances: None Visual Aid: None Home Medications: None Jewelry: Ring Clothing: Jacket/Coat, Pajamas Other Valuables: None PATIENT INSTRUCTIONS: 
 
 
F-face looks uneven A-arms unable to move or move unevenly S-speech slurred or non-existent T-time-call 911 as soon as signs and symptoms begin-DO NOT go Back to bed or wait to see if you get better-TIME IS BRAIN. Warning Signs of HEART ATTACK Call 911 if you have these symptoms: 
? Chest discomfort. Most heart attacks involve discomfort in the center of the chest that lasts more than a few minutes, or that goes away and comes back. It can feel like uncomfortable pressure, squeezing, fullness, or pain. ? Discomfort in other areas of the upper body. Symptoms can include pain or discomfort in one or both arms, the back, neck, jaw, or stomach. ? Shortness of breath with or without chest discomfort. ? Other signs may include breaking out in a cold sweat, nausea, or lightheadedness. Don't wait more than five minutes to call [address]! Fast action can save your life. Calling 911 is almost always the fastest way to get lifesaving treatment. Emergency Medical Services staff can begin treatment when they arrive  up to an hour sooner than if someone gets to the hospital by car. The discharge information has been reviewed with the patient. The patient verbalized understanding. Discharge medications reviewed with the patient and appropriate educational materials and side effects teaching were provided. Discharge Orders None Introducing [facility]! Rubén Wrener introduces [employer] patient portal. Now you can access parts of your medical record, email your doctor's office, and request medication refills online.    
 
1. In your internet browser, go to [email or web address] 2. Click on the First Time User? Click Here link in the Sign In box. You will see the New Member Sign Up page. 3. Enter your [employer] Access Code exactly as it appears below. You will not need to use this code after youve completed the sign-up process. If you do not sign up before the expiration date, you must request a new code. · [employer] Access Code: [email or web address] 
Expires: 2/7/2017  9:25 AM 
 
4. Enter the last four digits of your Social Security Number (xxxx) and Date of Birth (mm/dd/yyyy) as indicated and click Submit. You will be taken to the next sign-up page. 5. Create a [employer] ID. This will be your [employer] login ID and cannot be changed, so think of one that is secure and easy to remember. 6. Create a [employer] password. You can change your password at any time. 7. Enter your Password Reset Question and Answer. This can be used at a later time if you forget your password. 8. Enter your e-mail address. You will receive e-mail notification when new information is available in [address] 9. Click Sign Up. You can now view and download portions of your medical record. 10. Click the Download Summary menu link to download a portable copy of your medical information. If you have questions, please visit the Frequently Asked Questions section of the [employer] website. Remember, [employer] is NOT to be used for urgent needs. For medical emergencies, dial 911. Now available from your iPhone and Android! General Information Please provide this summary of care documentation to your next provider. Patient Signature:  ____________________________________________________________ Date:  ____________________________________________________________  
  
Elia Naas Provider Signature:  ____________________________________________________________ Date:  ____________________________________________________________

## 2017-02-04 NOTE — ED NOTES
Attempted to call report after initially attempting to due so almost an hour ago,  told me that there was a code and that no nurses were available, I said one nurse should be on the floor that could take the report, the  then hung up on me.

## 2017-02-04 NOTE — PROGRESS NOTES
TRANSFER - IN REPORT:    Verbal report received from Eleni Newell via Saul(name) on Salinas Alvarado  being received from ER(unit) for routine progression of care      Report consisted of patients Situation, Background, Assessment and   Recommendations(SBAR). Information from the following report(s) Kardex was reviewed with the receiving nurse. Opportunity for questions and clarification was provided. Assessment completed upon patients arrival to unit and care assumed.

## 2017-02-04 NOTE — ED NOTES
Attempted to call report 3 times, after being hung up on by the , she is now not answering the phone.

## 2017-02-04 NOTE — ED TRIAGE NOTES
EMS reports patient has been \"sick for a few days. \"  States history of UTI's. Patient seems altered.

## 2017-02-05 LAB
ALBUMIN SERPL BCP-MCNC: 2.6 G/DL (ref 3.2–4.6)
ALBUMIN/GLOB SERPL: 0.6 {RATIO} (ref 1.2–3.5)
ALP SERPL-CCNC: 86 U/L (ref 50–136)
ALT SERPL-CCNC: 22 U/L (ref 12–65)
ANION GAP BLD CALC-SCNC: 8 MMOL/L (ref 7–16)
AST SERPL W P-5'-P-CCNC: 27 U/L (ref 15–37)
ATRIAL RATE: 416 BPM
BASOPHILS # BLD AUTO: 0 K/UL (ref 0–0.2)
BASOPHILS # BLD: 0 % (ref 0–2)
BILIRUB SERPL-MCNC: 0.6 MG/DL (ref 0.2–1.1)
BUN SERPL-MCNC: 18 MG/DL (ref 8–23)
CALCIUM SERPL-MCNC: 8.7 MG/DL (ref 8.3–10.4)
CALCULATED P AXIS, ECG09: NORMAL DEGREES
CALCULATED R AXIS, ECG10: 50 DEGREES
CALCULATED T AXIS, ECG11: 76 DEGREES
CHLORIDE SERPL-SCNC: 107 MMOL/L (ref 98–107)
CO2 SERPL-SCNC: 26 MMOL/L (ref 21–32)
CREAT SERPL-MCNC: 0.96 MG/DL (ref 0.6–1)
DIAGNOSIS, 93000: NORMAL
DIASTOLIC BP, ECG02: NORMAL MMHG
DIFFERENTIAL METHOD BLD: ABNORMAL
EOSINOPHIL # BLD: 0.1 K/UL (ref 0–0.8)
EOSINOPHIL NFR BLD: 0 % (ref 0.5–7.8)
ERYTHROCYTE [DISTWIDTH] IN BLOOD BY AUTOMATED COUNT: 13.2 % (ref 11.9–14.6)
GLOBULIN SER CALC-MCNC: 4.1 G/DL (ref 2.3–3.5)
GLUCOSE SERPL-MCNC: 111 MG/DL (ref 65–100)
HCT VFR BLD AUTO: 32.8 % (ref 35.8–46.3)
HGB BLD-MCNC: 10.6 G/DL (ref 11.7–15.4)
IMM GRANULOCYTES # BLD: 0.1 K/UL (ref 0–0.5)
IMM GRANULOCYTES NFR BLD AUTO: 0.7 % (ref 0–5)
LYMPHOCYTES # BLD AUTO: 10 % (ref 13–44)
LYMPHOCYTES # BLD: 1.5 K/UL (ref 0.5–4.6)
MCH RBC QN AUTO: 29.9 PG (ref 26.1–32.9)
MCHC RBC AUTO-ENTMCNC: 32.3 G/DL (ref 31.4–35)
MCV RBC AUTO: 92.4 FL (ref 79.6–97.8)
MONOCYTES # BLD: 1 K/UL (ref 0.1–1.3)
MONOCYTES NFR BLD AUTO: 6 % (ref 4–12)
NEUTS SEG # BLD: 12.5 K/UL (ref 1.7–8.2)
NEUTS SEG NFR BLD AUTO: 83 % (ref 43–78)
P-R INTERVAL, ECG05: NORMAL MS
PLATELET # BLD AUTO: 154 K/UL (ref 150–450)
PMV BLD AUTO: 10.9 FL (ref 10.8–14.1)
POTASSIUM SERPL-SCNC: 3.8 MMOL/L (ref 3.5–5.1)
PROCALCITONIN SERPL-MCNC: 31.6 NG/ML
PROT SERPL-MCNC: 6.7 G/DL (ref 6.3–8.2)
Q-T INTERVAL, ECG07: 356 MS
QRS DURATION, ECG06: 84 MS
QTC CALCULATION (BEZET), ECG08: 400 MS
RBC # BLD AUTO: 3.55 M/UL (ref 4.05–5.25)
SODIUM SERPL-SCNC: 141 MMOL/L (ref 136–145)
SYSTOLIC BP, ECG01: NORMAL MMHG
VENTRICULAR RATE, ECG03: 76 BPM
WBC # BLD AUTO: 15.1 K/UL (ref 4.3–11.1)

## 2017-02-05 PROCEDURE — 84145 PROCALCITONIN (PCT): CPT | Performed by: INTERNAL MEDICINE

## 2017-02-05 PROCEDURE — 94640 AIRWAY INHALATION TREATMENT: CPT

## 2017-02-05 PROCEDURE — 74011000250 HC RX REV CODE- 250: Performed by: INTERNAL MEDICINE

## 2017-02-05 PROCEDURE — 74011250636 HC RX REV CODE- 250/636: Performed by: EMERGENCY MEDICINE

## 2017-02-05 PROCEDURE — 65270000029 HC RM PRIVATE

## 2017-02-05 PROCEDURE — 94760 N-INVAS EAR/PLS OXIMETRY 1: CPT

## 2017-02-05 PROCEDURE — 85025 COMPLETE CBC W/AUTO DIFF WBC: CPT | Performed by: INTERNAL MEDICINE

## 2017-02-05 PROCEDURE — 97162 PT EVAL MOD COMPLEX 30 MIN: CPT

## 2017-02-05 PROCEDURE — 80053 COMPREHEN METABOLIC PANEL: CPT | Performed by: INTERNAL MEDICINE

## 2017-02-05 PROCEDURE — 74011250636 HC RX REV CODE- 250/636: Performed by: INTERNAL MEDICINE

## 2017-02-05 PROCEDURE — 74011250637 HC RX REV CODE- 250/637: Performed by: INTERNAL MEDICINE

## 2017-02-05 PROCEDURE — 77010033678 HC OXYGEN DAILY

## 2017-02-05 PROCEDURE — 74011000258 HC RX REV CODE- 258: Performed by: EMERGENCY MEDICINE

## 2017-02-05 PROCEDURE — 36415 COLL VENOUS BLD VENIPUNCTURE: CPT | Performed by: INTERNAL MEDICINE

## 2017-02-05 RX ORDER — ACETAMINOPHEN 325 MG/1
650 TABLET ORAL
Status: DISCONTINUED | OUTPATIENT
Start: 2017-02-05 | End: 2017-02-07 | Stop reason: HOSPADM

## 2017-02-05 RX ORDER — HYDROCODONE BITARTRATE AND ACETAMINOPHEN 7.5; 325 MG/1; MG/1
1 TABLET ORAL
Status: DISCONTINUED | OUTPATIENT
Start: 2017-02-05 | End: 2017-02-07 | Stop reason: HOSPADM

## 2017-02-05 RX ADMIN — HYDROCODONE BITARTRATE AND ACETAMINOPHEN 1 TABLET: 7.5; 325 TABLET ORAL at 09:36

## 2017-02-05 RX ADMIN — IPRATROPIUM BROMIDE AND ALBUTEROL SULFATE 3 ML: 2.5; .5 SOLUTION RESPIRATORY (INHALATION) at 03:08

## 2017-02-05 RX ADMIN — TAPENTADOL HYDROCHLORIDE 100 MG: 50 TABLET, FILM COATED ORAL at 15:20

## 2017-02-05 RX ADMIN — IPRATROPIUM BROMIDE AND ALBUTEROL SULFATE 3 ML: 2.5; .5 SOLUTION RESPIRATORY (INHALATION) at 12:16

## 2017-02-05 RX ADMIN — ACETAMINOPHEN 650 MG: 325 TABLET, FILM COATED ORAL at 18:00

## 2017-02-05 RX ADMIN — PREGABALIN 200 MG: 100 CAPSULE ORAL at 09:26

## 2017-02-05 RX ADMIN — HYDROCODONE BITARTRATE AND ACETAMINOPHEN 1 TABLET: 5; 325 TABLET ORAL at 02:40

## 2017-02-05 RX ADMIN — IPRATROPIUM BROMIDE AND ALBUTEROL SULFATE 3 ML: 2.5; .5 SOLUTION RESPIRATORY (INHALATION) at 16:00

## 2017-02-05 RX ADMIN — LOSARTAN POTASSIUM 100 MG: 50 TABLET ORAL at 09:26

## 2017-02-05 RX ADMIN — ATORVASTATIN CALCIUM 20 MG: 10 TABLET, FILM COATED ORAL at 23:30

## 2017-02-05 RX ADMIN — SOTALOL HYDROCHLORIDE 160 MG: 80 TABLET ORAL at 06:05

## 2017-02-05 RX ADMIN — PREGABALIN 200 MG: 100 CAPSULE ORAL at 15:12

## 2017-02-05 RX ADMIN — AZITHROMYCIN MONOHYDRATE 500 MG: 500 INJECTION, POWDER, LYOPHILIZED, FOR SOLUTION INTRAVENOUS at 17:41

## 2017-02-05 RX ADMIN — PREGABALIN 200 MG: 100 CAPSULE ORAL at 23:31

## 2017-02-05 RX ADMIN — LORAZEPAM 1 MG: 1 TABLET ORAL at 06:21

## 2017-02-05 RX ADMIN — SOTALOL HYDROCHLORIDE 160 MG: 80 TABLET ORAL at 17:42

## 2017-02-05 RX ADMIN — IPRATROPIUM BROMIDE AND ALBUTEROL SULFATE 3 ML: 2.5; .5 SOLUTION RESPIRATORY (INHALATION) at 08:38

## 2017-02-05 RX ADMIN — DULOXETINE HYDROCHLORIDE 60 MG: 60 CAPSULE, DELAYED RELEASE ORAL at 09:26

## 2017-02-05 RX ADMIN — IPRATROPIUM BROMIDE AND ALBUTEROL SULFATE 3 ML: 2.5; .5 SOLUTION RESPIRATORY (INHALATION) at 23:56

## 2017-02-05 RX ADMIN — CEFTRIAXONE 1 G: 1 INJECTION, POWDER, FOR SOLUTION INTRAMUSCULAR; INTRAVENOUS at 09:25

## 2017-02-05 RX ADMIN — HYDROCODONE BITARTRATE AND ACETAMINOPHEN 1 TABLET: 7.5; 325 TABLET ORAL at 23:31

## 2017-02-05 NOTE — PROGRESS NOTES
Norco effective.      02/04/17 7327   Pain 1   Pain Scale 1 Numeric (0 - 10)   Pain Intensity 1 0   Pain Reassessment 1 Yes

## 2017-02-05 NOTE — PROGRESS NOTES
Norco 5 mg po given. Will monitor.      02/04/17 2044   Pain 1   Pain Scale 1 Numeric (0 - 10)   Pain Intensity 1 5   Pain Onset 1 chronic   Pain Location 1 Generalized   Pain Orientation 1 Other (comment)  (all over)   Pain Description 1 Aching   Pain Intervention(s) 1 Medication (see MAR)

## 2017-02-05 NOTE — PROGRESS NOTES
Norco 5 mg po given. Will monitor.      02/05/17 0240   Pain 1   Pain Scale 1 Numeric (0 - 10)   Pain Intensity 1 10   Pain Onset 1 chronic   Pain Location 1 Generalized   Pain Orientation 1 Other (comment)   Pain Description 1 Aching   Pain Intervention(s) 1 Medication (see MAR)

## 2017-02-05 NOTE — PROGRESS NOTES
Bedside report received from night nurse Middlebrook Mitchel Assessment done as noted  Respiration even and unlabored 20/min; denies pain or nausea at present. Encouraged to call with needs.

## 2017-02-05 NOTE — PROGRESS NOTES
Hospitalist Progress Note    2017  Admit Date: 2017  8:21 AM   NAME: James Chowdary   :  1955   MRN:  [id number]   Attending: Cassandra Wasserman MD  PCP:  Alanis Linn MD      Admitted for: Sepsis secondary to UTI, and multilobar PNA. Hypoxic respiratory failure    SUBJECTIVE:   55yr old female with a pmhx sig for HTN and recurrent utis. Has been feeling unwell since thursday with a sore throat. Felt better Friday so did not go to the doctor. Last night weak with nausea and decreased po intake. On the morning of 2017 the family brought her to the er were she was noted to be febrile >101 and acute renal insufficiency also has a uti. Hospitalist were asked to admit.   [facility] Course  The patient was admitted. Also found to wheezing and to have acute bronchospasms secondary to her PNA. Started on breathing treatments. Despite how ill she is her main concern is her pain meds and anxiety.     Review of Systems negative with exception of pertinent positives noted above  Past medical history unchanged from H&P    PHYSICAL EXAM     Visit Vitals    /64 (BP 1 Location: Left arm, BP Patient Position: At rest)    Pulse 67    Temp 98.4 °F (36.9 °C)    Resp 18    Ht 5' 6\" (1.676 m)    Wt 113.6 kg (250 lb 7.1 oz)    SpO2 94%    Breastfeeding No    BMI 40.42 kg/m2      Temp (24hrs), Av.3 °F (36.8 °C), Min:97.5 °F (36.4 °C), Max:99.4 °F (37.4 °C)    Oxygen Therapy  O2 Sat (%): 94 % (17 121)  Pulse via Oximetry: 72 beats per minute (17 121)  O2 Device: Nasal cannula (17 121)  O2 Flow Rate (L/min): 2 l/min (17 121)  No intake or output data in the 24 hours ending 17 1316     General: No acute distress  mucous membranes pink and moist acyanotic anicteric  Neck:  Supple full range of motion  Lungs:  Air entry equal bilaterally and wheezing, no crepitations rales rhonchi   Heart:  Regular rate and rhythm,  No murmur, rub, or gallop  Abdomen: Soft, Non distended, Non tender, no rebound guarding Positive bowel sounds  Extremities: No cyanosis, clubbing or edema  Neurologic:  No focal deficits  Musculoskeletal: no   Joint swelling tenderness erythema  Skin:  No erythema, rashes noted          LAB  No results for input(s): GLUCPOC in the last 72 hours. No lab exists for component: [place]   Recent Labs      02/05/17   0721   WBC  15.1*   HGB  10.6*   HCT  32.8*   PLT  154     Recent Labs      02/05/17   0721  02/04/17   0822   NA  141  138   K  3.8  4.3   CL  107  102   CO2  26  28   GLU  111*  122*   BUN  18  25*   CREA  0.96  1.47*   MG   --   0.9*   CA  8.7  9.6   ALB  2.6*  3.0*   TBILI  0.6  0.9   ALT  22  27   SGOT  27  41*   BNPP   --   353       EKG and imaging reviewed personally by me  Xr Chest Sngl V    Result Date: 2/4/2017  AP chest radiograph History: sob, 57 years Female Sob, sepsis, hx of a fib Comparison: None available Findings:   Normal cardiomediastinal silhouette. Low lung volumes. There are patchy left lung airspace opacities most likely representing multifocal pneumonia. No evidence of pneumothorax, pleural effusion. Visualized soft tissue and osseous structures otherwise unremarkable. Impression:  Acute left lung multifocal pneumonia.      Results for orders placed or performed during the hospital encounter of 02/04/17   EKG, 12 LEAD, INITIAL   Result Value Ref Range    Systolic BP  mmHg    Diastolic BP  mmHg    Ventricular Rate 65 BPM    Atrial Rate 65 BPM    P-R Interval 216 ms    QRS Duration 90 ms    Q-T Interval 414 ms    QTC Calculation (Bezet) 430 ms    Calculated P Axis 36 degrees    Calculated R Axis 63 degrees    Calculated T Axis 76 degrees    Diagnosis       Normal sinus rhythm  ST & T wave abnormality, consider anterior ischemia  Abnormal ECG  When compared with ECG of 04-FEB-2017 09:12,    Nonspecific T wave abnormality now evident in Inferior leads  Inverted T waves have replaced nonspecific T wave abnormality in Anterior   leads  Confirmed by NATALIE ISSA (), HECTOR ANGELA ([id number]) on 2/4/2017 2:19:53 PM       XR Results (most recent):    Results from Hospital Encounter encounter on 02/04/17   XR CHEST SNGL V   Narrative AP chest radiograph    History: sob, 57 years Female Sob, sepsis, hx of a fib     Comparison: None available    Findings:   Normal cardiomediastinal silhouette. Low lung volumes. There are  patchy left lung airspace opacities most likely representing multifocal  pneumonia. No evidence of pneumothorax, pleural effusion. Visualized soft  tissue and osseous structures otherwise unremarkable. Impression Impression:  Acute left lung multifocal pneumonia. Active problems  Active Hospital Problems    Diagnosis Date Noted    Sepsis due to urinary tract infection ([facility] [address] 02/04/2017       ASSESSMENT  AND PLAN      · Sepsis - secondary to UTI, PNA, resp failure and acute renal insufficiency - continue supportive care as per sepsis protocol  · PNA- multilobar- continue abx and follow up cultures  · Group A strep positive on Ceftriaxone and azithro- should be appropriate coverage  · UTI- continue abx started in the er and follow up cultures  · Acute Hypoxic resp failure- will wean down O2 as her underlying problems are treated  · Acute Bronchospasm- continue duonebs   · Chronic pain- pain meds re-instated  · HTN- monitor BP and give prn meds as needed.    · Further mgt as her clinical course dictates  · Pt high risk given resp. failure, uti and concern for sepsis    DVT Prophylaxis:   Patient remains high risk for decompensation, given     Signed By: Cj Rain MD     February 5, 2017

## 2017-02-05 NOTE — PROGRESS NOTES
Problem: Mobility Impaired (Adult and Pediatric)  Goal: *Acute Goals and Plan of Care (Insert Text)  LTG:  (1.)Ms. Frost will move from supine to sit and sit to supine , scoot up and down and roll side to side in bed with INDEPENDENCE within 4-7 day(s). (2.)Ms. Frost will transfer from bed to chair and chair to bed with INDEPENDENCE using the least restrictive device within 4-7 day(s). (3.)Ms. Frost will ambulate with INDEPENDENCE for 500 feet with the least restrictive device within 4-7 day(s). (4.)Ms. Frost will ascend/descend 2 steps with S using handrail within 4-7 day(s). ________________________________________________________________________________________________       PHYSICAL THERAPY: INITIAL ASSESSMENT, AM 2/5/2017  INPATIENT: Hospital Day: 2  Payor: SELF PAY / Plan: [facility] SELF PAY / Product Type: Self Pay /      NAME/AGE/GENDER: Kathryn Novoa is a 57 y.o. female     PRIMARY DIAGNOSIS: Sepsis due to urinary tract infection ([facility] [address] <principal problem not specified> <principal problem not specified>        ICD-10: Treatment Diagnosis:       · Generalized Muscle Weakness (M62.81)  · Difficulty in walking, Not elsewhere classified (R26.2)  · Other abnormalities of gait and mobility (R26.89)   Precaution/Allergies:  Ace inhibitors; Augmentin [amoxicillin-pot clavulanate]; Reglan [metoclopramide]; and Trilipix [fenofibric acid (choline)]       ASSESSMENT:      Ms. Frost presents supine in bed upon physical therapist entry to room this session. She required Luma to sit up at the edge of the bed, and then was able to stand up with B UE support at [facility] with CGA to Luma provided. She was able to ambulate out into the hallway 100 feet using the RW with CGA only, and returned to sitting in recliner at end of session with no reports of pain or shortness of breath.  Pt may benefit from skilled PT to address the below listed deficits to improve her safety and independence with transfers and ambulation prior to discharge. This section established at most recent assessment   PROBLEM LIST (Impairments causing functional limitations):  1. Decreased Strength  2. Decreased ADL/Functional Activities  3. Decreased Transfer Abilities  4. Decreased Ambulation Ability/Technique  5. Decreased Balance  6. Decreased Activity Tolerance  7. Decreased Pacing Skills  8. Increased Fatigue  9. Decreased Flexibility/Joint Mobility  10. Decreased [place] with Home Exercise Program    INTERVENTIONS PLANNED: (Benefits and precautions of physical therapy have been discussed with the patient.)  1. Balance Exercise  2. Bed Mobility  3. Family Education  4. Gait Training  5. Home Exercise Program (HEP)  6. Neuromuscular Re-education/Strengthening  7. Range of Motion (ROM)  8. Therapeutic Activites  9. Therapeutic Exercise/Strengthening  10. Transfer Training  11. Group Therapy      TREATMENT PLAN: Frequency/Duration: 3 times a week for duration of hospital stay  Rehabilitation Potential For Stated Goals: GOOD      RECOMMENDED REHABILITATION/EQUIPMENT: (at time of discharge pending progress): Continue Skilled Therapy. HISTORY:   History of Present Injury/Illness (Reason for Referral):  Per MD note: \"58yr old female with a pmhx sig for HTN and recurrent utis. Has been feeling unwell since thursday with a sore throat. Felt better Friday so did not go to the doctor. Last night weak with nausea and decreased po intake. This morning family brought her to the er were she was noted to be febrile >101 and acute renal insufficiency also has a uti. Hospitalist asked to admit     Pt mobile and walks independently at baseline\"  Past Medical History/Comorbidities:   Ms. Frost  has a past medical history of Arrhythmia (2009); Arthritis; Atrial fibrillation ([address] (4/15/2013); Autoimmune disease ([facility] [address] Chronic pain; Dyspepsia (4/15/2013); Fibromyalgia (4/15/2013); Hyperlipidemia (4/15/2013);  Hypertension; Nausea & vomiting; Obesity; Obesity; Panic disorder (4/15/2013); and Psychiatric disorder. Ms. Anoop Montes De Oca  has a past surgical history that includes lap cholecystectomy (2003); tubal ligation (1988); tonsillectomy (1976); cardiac surg procedure unlist (2/8/2011); and knee arthroscopy (2008). Social History/Living Environment:   Home Environment: Private residence  # Steps to Enter: 2  Rails to Enter: Yes  Hand Rails : Right  One/Two Story Residence: One story  Living Alone: No  Support Systems: Spouse/Significant Other/Partner  Patient Expects to be Discharged to[de-identified] Private residence  Current DME Used/Available at Home:  (does not use AD at baseline)  Prior Level of Function/Work/Activity:  Pt ambulates with no AD at baseline; retired but helps take care of grandchildren  Personal Factors:          Sex:  female        Age:  57 y.o. Number of Personal Factors/Comorbidities that affect the Plan of Care: 3+: HIGH COMPLEXITY   EXAMINATION:   Most Recent Physical Functioning:   Gross Assessment:  AROM: Generally decreased, functional  Strength: Generally decreased, functional               Posture:  Posture (WDL): Exceptions to WDL  Posture Assessment: Cervical, Kyphosis, Forward head, Rounded shoulders, Trunk flexion, Increased  Balance:  Sitting: Intact  Standing: Impaired  Standing - Static: Fair (with B UE support at RW)  Standing - Dynamic : Fair (with B UE support at RW) Bed Mobility:  Rolling: Supervision  Supine to Sit: Minimum assistance  Sit to Supine:  (not assessed)  Wheelchair Mobility:     Transfers:  Sit to Stand: Minimum assistance;Contact guard assistance  Stand to Sit: Contact guard assistance;Minimum assistance (to control descent)  Gait:     Base of Support: Widened  Speed/Amber: Shuffled; Slow  Step Length: Left shortened;Right shortened  Gait Abnormalities: Decreased step clearance;Trunk sway increased; Shuffling gait  Distance (ft): 100 Feet (ft)  Assistive Device: Walker, rolling  Ambulation - Level of Assistance: Contact guard assistance;Supervision  Interventions: Tactile cues; Verbal cues (cues for upright posture, longer steps)       Body Structures Involved:  1. Bones  2. Joints  3. Muscles Body Functions Affected:  1. Neuromusculoskeletal  2. Movement Related Activities and Participation Affected:  1. General Tasks and Demands  2. Mobility  3. Self Care  4. Domestic Life  5. Interpersonal Interactions and Relationships  6. Community, Social and [place]   Number of elements that affect the Plan of Care: 4+: HIGH COMPLEXITY   CLINICAL PRESENTATION:   Presentation: Evolving clinical presentation with changing clinical characteristics: MODERATE COMPLEXITY   CLINICAL DECISION MAKIN [facility] Mobility Inpatient Short Form  How much difficulty does the patient currently have. .. Unable A Lot A Little None   1. Turning over in bed (including adjusting bedclothes, sheets and blankets)? [ ] 1   [ ] 2   [X] 3   [ ] 4   2. Sitting down on and standing up from a chair with arms ( e.g., wheelchair, bedside commode, etc.)   [ ] 1   [ ] 2   [X] 3   [ ] 4   3. Moving from lying on back to sitting on the side of the bed? [ ] 1   [ ] 2   [X] 3   [ ] 4   How much help from another person does the patient currently need. .. Total A Lot A Little None   4. Moving to and from a bed to a chair (including a wheelchair)? [ ] 1   [ ] 2   [X] 3   [ ] 4   5. Need to walk in hospital room? [ ] 1   [ ] 2   [X] 3   [ ] 4   6. Climbing 3-5 steps with a railing? [ ] 1   [X] 2   [ ] 3   [ ] 4   © , Trustees of [address] [place] [id number], under license to [employer]. All rights reserved    Score:  Initial: 17 Most Recent: X (Date: -- )     Interpretation of Tool:  Represents activities that are increasingly more difficult (i.e. Bed mobility, Transfers, Gait).        Score 24 23 22-20 19-15 14-10 9-7 6       Modifier CH CI CJ CK CL CM CN         · Mobility - Walking and Moving Around:  - CURRENT STATUS:    CK - 40%-59% impaired, limited or restricted               - GOAL STATUS:           CI - 1%-19% impaired, limited or restricted               - D/C STATUS:                       ---------------To be determined---------------  Payor: SELF PAY / Plan: [facility] SELF PAY / Product Type: Self Pay /       Medical Necessity:     · Patient is expected to demonstrate progress in strength, range of motion, balance and functional technique to improve safety during ambulation and transfers. · Patient demonstrates good rehab potential due to higher previous functional level. Reason for Services/Other Comments:  · Patient continues to require modification of therapeutic interventions to increase complexity of exercises. Use of outcome tool(s) and clinical judgement create a POC that gives a: Questionable prediction of patient's progress: MODERATE COMPLEXITY                 TREATMENT:   (In addition to Assessment/Re-Assessment sessions the following treatments were rendered)   Pre-treatment Symptoms/Complaints:  Pt initially asked if therapist could come back in 10 minutes due to pt having just gotten up. Willing to work with therapist on second attempt  Pain: Initial:   Pain Intensity 1: 0  Post Session:  No change in pain      Assessment/Reassessment only, no treatment provided today     Braces/Orthotics/Lines/Etc:   · IV  · O2 Device: Nasal cannula  Treatment/Session Assessment:    · Response to Treatment:  Pt sitting in chair at end of session with all needs met  · Interdisciplinary Collaboration:  · Physical Therapist  · Registered Nurse  · After treatment position/precautions:  · Up in chair  · Bed/Chair-wheels locked  · Call light within reach  · Family at bedside  · Compliance with Program/Exercises: Will assess as treatment progresses. · Recommendations/Intent for next treatment session:   \"Next visit will focus on advancements to more challenging activities and reduction in assistance provided\".   Total Treatment Duration:  PT Patient Time In/Time Out  Time In: 1046  Time Out: 1908 Karen Canas DPT

## 2017-02-05 NOTE — PROGRESS NOTES
Received bedside shift report from Chana Medrano RN. Pt lying in bed. No apparent distress. Respirations even and unlabored. Instructed to call for assistance with needs, as they arise. Pt voiced understanding.

## 2017-02-05 NOTE — PROGRESS NOTES
Norco effective.      02/05/17 0425   Pain 1   Pain Scale 1 Visual   Pain Intensity 1 0   Pain Reassessment 1 Patient sleeping

## 2017-02-06 PROBLEM — J18.9 PNEUMONIA INVOLVING LEFT LUNG: Status: ACTIVE | Noted: 2017-02-06

## 2017-02-06 PROBLEM — N28.9 RENAL INSUFFICIENCY: Status: ACTIVE | Noted: 2017-02-06

## 2017-02-06 PROBLEM — J96.01 ACUTE RESPIRATORY FAILURE WITH HYPOXIA (HCC): Status: ACTIVE | Noted: 2017-02-06

## 2017-02-06 PROBLEM — J02.0 STREP THROAT: Status: ACTIVE | Noted: 2017-02-06

## 2017-02-06 LAB
BACTERIA SPEC CULT: NORMAL
SERVICE CMNT-IMP: NORMAL

## 2017-02-06 PROCEDURE — 65270000029 HC RM PRIVATE

## 2017-02-06 PROCEDURE — 94760 N-INVAS EAR/PLS OXIMETRY 1: CPT

## 2017-02-06 PROCEDURE — 74011250637 HC RX REV CODE- 250/637: Performed by: INTERNAL MEDICINE

## 2017-02-06 PROCEDURE — 74011250636 HC RX REV CODE- 250/636: Performed by: EMERGENCY MEDICINE

## 2017-02-06 PROCEDURE — 74011000250 HC RX REV CODE- 250: Performed by: INTERNAL MEDICINE

## 2017-02-06 PROCEDURE — 74011250636 HC RX REV CODE- 250/636: Performed by: INTERNAL MEDICINE

## 2017-02-06 PROCEDURE — 77010033678 HC OXYGEN DAILY

## 2017-02-06 PROCEDURE — 94640 AIRWAY INHALATION TREATMENT: CPT

## 2017-02-06 PROCEDURE — 74011000258 HC RX REV CODE- 258: Performed by: EMERGENCY MEDICINE

## 2017-02-06 RX ADMIN — IPRATROPIUM BROMIDE AND ALBUTEROL SULFATE 3 ML: 2.5; .5 SOLUTION RESPIRATORY (INHALATION) at 03:46

## 2017-02-06 RX ADMIN — IPRATROPIUM BROMIDE AND ALBUTEROL SULFATE 3 ML: 2.5; .5 SOLUTION RESPIRATORY (INHALATION) at 06:17

## 2017-02-06 RX ADMIN — LORAZEPAM 1 MG: 1 TABLET ORAL at 05:23

## 2017-02-06 RX ADMIN — LORAZEPAM 1 MG: 1 TABLET ORAL at 22:43

## 2017-02-06 RX ADMIN — DULOXETINE HYDROCHLORIDE 60 MG: 60 CAPSULE, DELAYED RELEASE ORAL at 09:14

## 2017-02-06 RX ADMIN — IPRATROPIUM BROMIDE AND ALBUTEROL SULFATE 3 ML: 2.5; .5 SOLUTION RESPIRATORY (INHALATION) at 20:40

## 2017-02-06 RX ADMIN — ATORVASTATIN CALCIUM 20 MG: 10 TABLET, FILM COATED ORAL at 22:37

## 2017-02-06 RX ADMIN — SOTALOL HYDROCHLORIDE 160 MG: 80 TABLET ORAL at 05:16

## 2017-02-06 RX ADMIN — PREGABALIN 200 MG: 100 CAPSULE ORAL at 22:37

## 2017-02-06 RX ADMIN — PREGABALIN 200 MG: 100 CAPSULE ORAL at 16:51

## 2017-02-06 RX ADMIN — SOTALOL HYDROCHLORIDE 160 MG: 80 TABLET ORAL at 16:53

## 2017-02-06 RX ADMIN — CEFTRIAXONE 1 G: 1 INJECTION, POWDER, FOR SOLUTION INTRAMUSCULAR; INTRAVENOUS at 09:14

## 2017-02-06 RX ADMIN — IPRATROPIUM BROMIDE AND ALBUTEROL SULFATE 3 ML: 2.5; .5 SOLUTION RESPIRATORY (INHALATION) at 11:18

## 2017-02-06 RX ADMIN — IPRATROPIUM BROMIDE AND ALBUTEROL SULFATE 3 ML: 2.5; .5 SOLUTION RESPIRATORY (INHALATION) at 15:35

## 2017-02-06 RX ADMIN — AZITHROMYCIN MONOHYDRATE 500 MG: 500 INJECTION, POWDER, LYOPHILIZED, FOR SOLUTION INTRAVENOUS at 16:57

## 2017-02-06 RX ADMIN — TAPENTADOL HYDROCHLORIDE 100 MG: 50 TABLET, FILM COATED ORAL at 05:16

## 2017-02-06 RX ADMIN — HYDROCODONE BITARTRATE AND ACETAMINOPHEN 1 TABLET: 7.5; 325 TABLET ORAL at 22:43

## 2017-02-06 RX ADMIN — LOSARTAN POTASSIUM 100 MG: 50 TABLET ORAL at 09:14

## 2017-02-06 RX ADMIN — PREGABALIN 200 MG: 100 CAPSULE ORAL at 09:14

## 2017-02-06 RX ADMIN — TAPENTADOL HYDROCHLORIDE 100 MG: 50 TABLET, FILM COATED ORAL at 16:51

## 2017-02-06 NOTE — PROGRESS NOTES
Pt resting quietly in bed on 2L O2 via NC. No s/s of acute distress noted. Dr. Buzz Schrader in room.

## 2017-02-06 NOTE — PROGRESS NOTES
Problem: Nutrition Deficit  Goal: *Optimize nutritional status  Nutrition  Reason for assessment: Referral received from nursing admission Malnutrition Screening Tool for recently lost unsure amount without trying and eating poorly due to decreased appetite. Assessment:   Diet order(s): Cardiac  Food/Nutrition Patient History:  Pt presents with poor appetite and nausea. States that she just doesn't feel like eating. States that no matter what she consumes it makes her feel nauseous. Discussed ordering options for \"comfort type foods\" Pt reports that she is having a very hard time swallowing due to strep throat. Encouraged pt to order soups, yogurt, milk and other soft foods. Pt declines supplements at this time. Pt denies any recent weight loss. Anthropometrics:Height: 5' 6\" (167.6 cm),  Weight: 114.9 kg (253 lb 3.2 oz), Weight Source: Standing scale (comment), Body mass index is 40.87 kg/(m^2). BMI class of morbid obesity class III. Macronutrient needs:  EER:  4959-3733 kcal /day (13-15 kcal/kg actual BW)  EPR:  59-71 grams protein/day (1-1.2 grams/kg IBW)  Intake/Comparative Standards: Average intake for past 3 recorded meal(s): 45%. This potentially meets ~59% of kcal and ~67% of protein needs     Nutrition Diagnosis: Inadequate oral intake related to poor appetite and swallowing difficulty as evidenced by pt with strep throat and not meeting estimated needs as noted above. Intervention:  Meals and snacks: Continue current diet. Nutrition Supplement Therapy: None at this time. Pt declines      Alfred[address], [address],  [phone]

## 2017-02-06 NOTE — PROGRESS NOTES
Pt given Nucynta 100mg PO and Ativan 1mg PO per pt request for 5/10 generalized pain and pt report of anxiety. Will monitor.

## 2017-02-06 NOTE — PROGRESS NOTES
Verbal bedside report given to oncoming nurse Chiara Patient's situation, background, assessment and recommendations provided. Opportunity for questions provided. No s/s of pain noted. No distress noted. Oncoming RN assumed care of patient.

## 2017-02-06 NOTE — PROGRESS NOTES
AM assessment completed, patient denies pain and denies nausea and vomiting. Respirations are even and unlabored. Will continue to monitor.

## 2017-02-06 NOTE — PROGRESS NOTES
Discharge instructions given to patient and prescriptions given to patient. Discharged to home via w/c accompanied by staff and family member. Patient denies pain.

## 2017-02-06 NOTE — PROGRESS NOTES
Care Management Interventions  PCP Verified by CM: Yes  Physical Therapy Consult: Yes  Current Support Network: Lives with Spouse  Confirm Follow Up Transport: Self  Plan discussed with Pt/Family/Caregiver: Yes  Freedom of Choice Offered: Yes  Discharge Location  Discharge Placement: Home  Pt is alert and oriented in all spheres. Spouse at bedside. PTA pt was independent with ambulation and ADLs. No home 02. PT consulted. Ppd placed. SW following for dc needs.

## 2017-02-06 NOTE — PROGRESS NOTES
Pt resting quietly in bed, HOB elevated. PT A&Ox4 on 2L O2 via NC. Lung sounds clear but diminished, S1/S2 audible, peripheral pulses palpable, extremities warm. Pt has IV to right AC, site clean, dry, and intact. Pt reports relief of febrile symptoms, overall pain of 2/10. Call light in place, pt instructed to call for assistance as needed.

## 2017-02-06 NOTE — PROGRESS NOTES
Hospitalist Progress Note    Subjective:   Daily Progress Note: 2/6/2017 3:48 PM    Ms. Frost is a 57year old white female, with a pmh of HTN and recurrent UTIs, who presented 2/4/17 for several days of feeling unwell and a sore throat and is found to have a UTI, ESTELLE, strep throat and multilobar left sided probable gram negative pneumonia. She was started on NS, rocephin and zithromax on admission. She is slowly starting to feel better. Course associated with ESTELLE and creatinine now back to baseline with NS IVFs. Also associated with acute hypoxic respiratory failure, initially requiring 4L NC to maintain appropriate sats. Down now to 2L NC, does not wear oxygen at home. Currently without sob, chest pain, nausea, diarrhea.      Current Facility-Administered Medications   Medication Dose Route Frequency    HYDROcodone-acetaminophen (NORCO) 7.5-325 mg per tablet 1 Tab  1 Tab Oral Q8H PRN    tapentadol (NUCYNTA) tablet 100 mg  100 mg Oral TID PRN    acetaminophen (TYLENOL) tablet 650 mg  650 mg Oral Q6H PRN    sodium chloride (NS) flush 5-10 mL  5-10 mL IntraVENous PRN    cefTRIAXone (ROCEPHIN) 1 g in 0.9% sodium chloride (MBP/ADV) 50 mL  1 g IntraVENous Q24H    sodium chloride (NS) flush 5-10 mL  5-10 mL IntraVENous PRN    atorvastatin (LIPITOR) tablet 20 mg  20 mg Oral QHS    DULoxetine (CYMBALTA) capsule 60 mg  60 mg Oral DAILY    losartan (COZAAR) tablet 100 mg  100 mg Oral DAILY    sotalol (BETAPACE) tablet 160 mg  160 mg Oral BID    albuterol-ipratropium (DUO-NEB) 2.5 MG-0.5 MG/3 ML  3 mL Nebulization Q4H RT    azithromycin (ZITHROMAX) 500 mg in 0.9% sodium chloride (MBP/ADV) 250 mL  500 mg IntraVENous Q24H    LORazepam (ATIVAN) tablet 1 mg  1 mg Oral Q8H PRN    pregabalin (LYRICA) capsule 200 mg  200 mg Oral TID        Review of Systems  A comprehensive 12 point ROS was obtained and findings negative unless stated otherwise in above HPI    Objective:     Visit Vitals    BP (!) 183/95 (BP 1 Location: Right arm, BP Patient Position: At rest)    Pulse 70    Temp 97.4 °F (36.3 °C)    Resp 16    Ht 5' 6\" (1.676 m)    Wt 114.9 kg (253 lb 3.2 oz)    SpO2 95%    Breastfeeding No    BMI 40.87 kg/m2    O2 Flow Rate (L/min): 1 l/min O2 Device: Nasal cannula    Temp (24hrs), Av.3 °F (36.8 °C), Min:97.3 °F (36.3 °C), Max:101.9 °F (38.8 °C)      701 -  1900  In: 120 [P.O.:120]  Out: -    190 -  0700  In: 0 [P.O.:650]  Out: -     General: awake, lethargic but alert and oriented, obese, no acute distress  Eyes; non icteric, EoMI  Neck; supple  CV: RRR  Abd; soft, non tender, non distended, no rebound/guarding  PulM: diminished on left, no crackles    Additional comments: all labs, notes and studies from the past 24 hours have been personally reviewed today by me. Data Review    No results found for this or any previous visit (from the past 24 hour(s)). Assessment/Plan:     Principal Problem:    Sepsis due to urinary tract infection ([address] (2017)  -urine growing e coli. Also with strep throat and left sided pneumonia. Continue IV azithromycin and rocephin. Active Problems:    Pneumonia involving left lung (2017)      Renal insufficiency (2017)  -improved/resolved with IVFs      Strep throat (2017)      Acute respiratory failure with hypoxia ([address] (2017)  -wean oxygen to keep sats > 92%. Incentive spirometry to bedside. Due to left multilobar probable gram negative pneumonia. Home tomorrow if off of oxygen? Care Plan discussed with:   The patient, her , her care team.     Signed By: Ana Rodríguez MD     2017

## 2017-02-06 NOTE — PROGRESS NOTES
Respirations even and unlabored. Denies pain. . Patient denies nausea and vomiting. Will continue to monitor.

## 2017-02-07 VITALS
HEIGHT: 66 IN | BODY MASS INDEX: 40.69 KG/M2 | RESPIRATION RATE: 18 BRPM | HEART RATE: 66 BPM | TEMPERATURE: 98.4 F | OXYGEN SATURATION: 93 % | WEIGHT: 253.2 LBS | DIASTOLIC BLOOD PRESSURE: 89 MMHG | SYSTOLIC BLOOD PRESSURE: 163 MMHG

## 2017-02-07 LAB
ANION GAP BLD CALC-SCNC: 9 MMOL/L (ref 7–16)
BASOPHILS # BLD AUTO: 0 K/UL (ref 0–0.2)
BASOPHILS # BLD: 0 % (ref 0–2)
BUN SERPL-MCNC: 11 MG/DL (ref 8–23)
CALCIUM SERPL-MCNC: 9.5 MG/DL (ref 8.3–10.4)
CHLORIDE SERPL-SCNC: 105 MMOL/L (ref 98–107)
CO2 SERPL-SCNC: 29 MMOL/L (ref 21–32)
CREAT SERPL-MCNC: 0.72 MG/DL (ref 0.6–1)
DIFFERENTIAL METHOD BLD: ABNORMAL
EOSINOPHIL # BLD: 0.1 K/UL (ref 0–0.8)
EOSINOPHIL NFR BLD: 1 % (ref 0.5–7.8)
ERYTHROCYTE [DISTWIDTH] IN BLOOD BY AUTOMATED COUNT: 13.2 % (ref 11.9–14.6)
GLUCOSE SERPL-MCNC: 95 MG/DL (ref 65–100)
HCT VFR BLD AUTO: 33.2 % (ref 35.8–46.3)
HGB BLD-MCNC: 11 G/DL (ref 11.7–15.4)
IMM GRANULOCYTES # BLD: 0.6 K/UL (ref 0–0.5)
LYMPHOCYTES # BLD AUTO: 14 % (ref 13–44)
LYMPHOCYTES # BLD: 2 K/UL (ref 0.5–4.6)
MAGNESIUM SERPL-MCNC: 1.4 MG/DL (ref 1.8–2.4)
MCH RBC QN AUTO: 30 PG (ref 26.1–32.9)
MCHC RBC AUTO-ENTMCNC: 33.1 G/DL (ref 31.4–35)
MCV RBC AUTO: 90.5 FL (ref 79.6–97.8)
MONOCYTES # BLD: 1.7 K/UL (ref 0.1–1.3)
MONOCYTES NFR BLD AUTO: 12 % (ref 4–12)
NEUTS SEG # BLD: 10.5 K/UL (ref 1.7–8.2)
NEUTS SEG NFR BLD AUTO: 73 % (ref 43–78)
PHOSPHATE SERPL-MCNC: 2.1 MG/DL (ref 2.3–3.7)
PLATELET # BLD AUTO: 189 K/UL (ref 150–450)
PLATELET COMMENTS,PCOM: ADEQUATE
PMV BLD AUTO: 11.2 FL (ref 10.8–14.1)
POTASSIUM SERPL-SCNC: 3.4 MMOL/L (ref 3.5–5.1)
RBC # BLD AUTO: 3.67 M/UL (ref 4.05–5.25)
RBC MORPH BLD: ABNORMAL
SODIUM SERPL-SCNC: 143 MMOL/L (ref 136–145)
WBC # BLD AUTO: 14.4 K/UL (ref 4.3–11.1)
WBC MORPH BLD: ABNORMAL

## 2017-02-07 PROCEDURE — 36415 COLL VENOUS BLD VENIPUNCTURE: CPT | Performed by: INTERNAL MEDICINE

## 2017-02-07 PROCEDURE — 85025 COMPLETE CBC W/AUTO DIFF WBC: CPT | Performed by: INTERNAL MEDICINE

## 2017-02-07 PROCEDURE — 77010033678 HC OXYGEN DAILY

## 2017-02-07 PROCEDURE — 74011250637 HC RX REV CODE- 250/637: Performed by: INTERNAL MEDICINE

## 2017-02-07 PROCEDURE — 84100 ASSAY OF PHOSPHORUS: CPT | Performed by: INTERNAL MEDICINE

## 2017-02-07 PROCEDURE — 83735 ASSAY OF MAGNESIUM: CPT | Performed by: INTERNAL MEDICINE

## 2017-02-07 PROCEDURE — 74011250636 HC RX REV CODE- 250/636: Performed by: INTERNAL MEDICINE

## 2017-02-07 PROCEDURE — 74011000250 HC RX REV CODE- 250: Performed by: INTERNAL MEDICINE

## 2017-02-07 PROCEDURE — 94760 N-INVAS EAR/PLS OXIMETRY 1: CPT

## 2017-02-07 PROCEDURE — 80048 BASIC METABOLIC PNL TOTAL CA: CPT | Performed by: INTERNAL MEDICINE

## 2017-02-07 PROCEDURE — 74011250636 HC RX REV CODE- 250/636: Performed by: EMERGENCY MEDICINE

## 2017-02-07 PROCEDURE — 94640 AIRWAY INHALATION TREATMENT: CPT

## 2017-02-07 PROCEDURE — 74011000258 HC RX REV CODE- 258: Performed by: EMERGENCY MEDICINE

## 2017-02-07 PROCEDURE — 77030020120 HC VLV RESP PEP HI -B

## 2017-02-07 RX ORDER — POTASSIUM CHLORIDE 20 MEQ/1
40 TABLET, EXTENDED RELEASE ORAL
Status: COMPLETED | OUTPATIENT
Start: 2017-02-07 | End: 2017-02-07

## 2017-02-07 RX ORDER — CEFPODOXIME PROXETIL 200 MG/1
200 TABLET, FILM COATED ORAL 2 TIMES DAILY
Qty: 14 TAB | Refills: 0 | Status: SHIPPED | OUTPATIENT
Start: 2017-02-07 | End: 2017-02-14

## 2017-02-07 RX ORDER — MAGNESIUM SULFATE HEPTAHYDRATE 40 MG/ML
4 INJECTION, SOLUTION INTRAVENOUS ONCE
Status: COMPLETED | OUTPATIENT
Start: 2017-02-07 | End: 2017-02-07

## 2017-02-07 RX ORDER — AZITHROMYCIN 500 MG/1
500 TABLET, FILM COATED ORAL DAILY
Qty: 5 TAB | Refills: 0 | Status: SHIPPED | OUTPATIENT
Start: 2017-02-07 | End: 2017-02-12

## 2017-02-07 RX ADMIN — IPRATROPIUM BROMIDE AND ALBUTEROL SULFATE 3 ML: 2.5; .5 SOLUTION RESPIRATORY (INHALATION) at 07:32

## 2017-02-07 RX ADMIN — MAGNESIUM SULFATE HEPTAHYDRATE 4 G: 40 INJECTION, SOLUTION INTRAVENOUS at 10:39

## 2017-02-07 RX ADMIN — IPRATROPIUM BROMIDE AND ALBUTEROL SULFATE 3 ML: 2.5; .5 SOLUTION RESPIRATORY (INHALATION) at 11:12

## 2017-02-07 RX ADMIN — SOTALOL HYDROCHLORIDE 160 MG: 80 TABLET ORAL at 05:35

## 2017-02-07 RX ADMIN — PREGABALIN 200 MG: 100 CAPSULE ORAL at 09:08

## 2017-02-07 RX ADMIN — LOSARTAN POTASSIUM 100 MG: 50 TABLET ORAL at 09:08

## 2017-02-07 RX ADMIN — DULOXETINE HYDROCHLORIDE 60 MG: 60 CAPSULE, DELAYED RELEASE ORAL at 09:08

## 2017-02-07 RX ADMIN — CEFTRIAXONE 1 G: 1 INJECTION, POWDER, FOR SOLUTION INTRAMUSCULAR; INTRAVENOUS at 09:08

## 2017-02-07 RX ADMIN — POTASSIUM CHLORIDE 40 MEQ: 20 TABLET, EXTENDED RELEASE ORAL at 09:08

## 2017-02-07 RX ADMIN — LORAZEPAM 1 MG: 1 TABLET ORAL at 05:35

## 2017-02-07 NOTE — PROGRESS NOTES
Spoke again with patient and  about her medical course and plan of care. All questions answered and now ready to discharge.      Jeff Palma MD

## 2017-02-07 NOTE — PROGRESS NOTES
Discharge instructions and prescriptions provided and explained to the pt. Med side effect sheet reviewed. Opportunity for questions provided. Pt to receive Mag infusion and oxygen delivery before she can leave. Instructed to call once ready to leave.

## 2017-02-07 NOTE — PROGRESS NOTES
When assistant went into room to obtain vital signs, pt.'s O2 saturation is 83%. Turned up to [address] [place] [address] and pt. Returned to 93%. Will monitor.

## 2017-02-07 NOTE — PROGRESS NOTES
Pt resting quietly in bed, HOB elevated,  and son at bedside. PT A&Ox4 on 1L O2 via NC. Lung sounds clear, S1/S2 audible, peripheral pulses palpable, extremities warm. Pt has IV to right AC, site clean, dry, and intact. Pt reports 2/10 pain, states she is satisfied with that pain level. Call light in place, pt instructed to call for assistance as needed.

## 2017-02-07 NOTE — DISCHARGE INSTRUCTIONS
Oxygen Therapy: Care Instructions  Your Care Instructions  Oxygen therapy helps you get more oxygen into your lungs and bloodstream. You may use it if you have a disease that makes it hard to breathe, such as COPD, pulmonary fibrosis (scarring of the lungs), or heart failure. Oxygen therapy can make it easier for you to breathe and can reduce your heart's workload. Some people need extra oxygen all the time. Others need it from time to time throughout the day or overnight. A doctor will prescribe how much oxygen you need and how often to use it. To breathe the oxygen, most people use a nasal cannula (say \"ALEXANDER-yuh-cong\"). This is a thin tube with two prongs that fit just inside your nose. People who need a lot of oxygen may need to use a mask that fits over the nose and mouth. Follow-up care is a key part of your treatment and safety. Be sure to make and go to all appointments, and call your doctor if you are having problems. It's also a good idea to know your test results and keep a list of the medicines you take. How can you care for yourself at home? To help yourself  · Using oxygen may dry out your nose or lips. Use water-based lubricants on your lips or nostrils. Do not use an oil-based product like petroleum jelly. · If you use a nasal cannula, the tubing may rub under your nostrils and around your ears. To keep your skin from getting sore, tuck some gauze under the tubing. Use a water-based lotion on rubbed areas. · Do not use alcohol or take drugs that relax you, because they will slow your breathing rate. · Keep track of how much oxygen is in the tank, and reorder before it runs out. If a holiday is coming up or you expect bad weather, order in advance or make your regular order larger. · You may need extra oxygen when you travel to high altitudes or travel by plane. Ask your doctor about this.   · If you are getting oxygen directly to your windpipe through an opening in your neck, your doctor will teach you how to care for the equipment. To make sure oxygen is flowing  · Check the flow by holding your mask or cannula up to your ear and listening for the \"hiss\" of airflow. · If you have a nasal cannula, dip the prongs in a glass of water. If you see bubbles, oxygen is coming through. · Check your pressure gauge or contents indicator. · If you use an oxygen concentrator, make sure it is turned on and plugged in. If you use a cylinder, make sure the valve is open. · Look for kinks, blockages, or water in the tubing. Be sure the tubing is connected to the oxygen source. · Do not change your oxygen flow rate. Your doctor sets this at the correct level. Higher flow rates usually do not help and can increase the risk of harmful carbon dioxide buildup in the blood. To be safe  · Do not leave cords or tubing running across an area where you or someone else may trip on it. · Do not let oxygen containers get hot. Store them in a cool place where there is airflow. Do not leave them in a car trunk or a hot vehicle. · Keep oxygen containers upright. Make sure they do not fall over and get damaged. Try securing the tanks in a sturdy container or securing them with a rope or a chain. · Watch for signs of oxygen leaks. If you hear a loud hissing from your container or if it empties too fast, stay away from the container. Open windows right away and call the company that brought the oxygen system to your home. · Do not use oxygen around anything that could spark or easily cause a fire. ¨ Do not smoke or let others smoke while you are using oxygen. Put up \"no smoking\" signs in your home. ¨ Do not use oxygen near open flames, such as candles, fireplaces, gas stoves, or hot water heaters. Do not use it near electric razors, hair dryers, heating pads, or anything that may spark. ¨ Keep a working fire extinguisher in your home where it is easy to get to.   ¨ If a fire starts, turn off the oxygen right away and leave the house. ¨ If you have an oxygen concentrator, do not use it if the cord looks damaged. Do not use an extension cord to plug it in. Do not plug it into an outlet that has other appliances plugged into it. To care for the equipment  · Follow the directions that come with the equipment for using and caring for it. · Wash your cannula or mask with a liquid soap and warm water 1 or 2 times a week. Replace them every 2 to 4 weeks. · If you have a cold, change the nasal prongs when your cold symptoms are done. · If you have an oxygen concentrator, unplug the unit and wipe down the cabinet with a damp cloth daily. Clean the air filter at least 2 times a week. Where can you learn more? Go to http://shaheen-thierno.info/. Enter E117 in the search box to learn more about \"Oxygen Therapy: Care Instructions. \"  Current as of: May 23, 2016  Content Version: 11.1  © 3443-2400 [employer]. Care instructions adapted under license by [employer] (which disclaims liability or warranty for this information). If you have questions about a medical condition or this instruction, always ask your healthcare professional. [place] [address] any warranty or liability for your use of this information. Pneumonia: Care Instructions  Your Care Instructions    Pneumonia is an infection of the lungs. Most cases are caused by infections from bacteria or viruses. Pneumonia may be mild or very severe. If it is caused by bacteria, you will be treated with antibiotics. It may take a few weeks to a few months to recover fully from pneumonia, depending on how sick you were and whether your overall health is good. Follow-up care is a key part of your treatment and safety. Be sure to make and go to all appointments, and call your doctor if you are having problems. Its also a good idea to know your test results and keep a list of the medicines you take.   How can you care for yourself at home? · Take your antibiotics exactly as directed. Do not stop taking the medicine just because you are feeling better. You need to take the full course of antibiotics. · Take your medicines exactly as prescribed. Call your doctor if you think you are having a problem with your medicine. · Get plenty of rest and sleep. You may feel weak and tired for a while, but your energy level will improve with time. · To prevent dehydration, drink plenty of fluids, enough so that your urine is light yellow or clear like water. Choose water and other caffeine-free clear liquids until you feel better. If you have kidney, heart, or liver disease and have to limit fluids, talk with your doctor before you increase the amount of fluids you drink. · Take care of your cough so you can rest. A cough that brings up mucus from your lungs is common with pneumonia. It is one way your body gets rid of the infection. But if coughing keeps you from resting or causes severe fatigue and chest-wall pain, talk to your doctor. He or she may suggest that you take a medicine to reduce the cough. · Use a vaporizer or humidifier to add moisture to your bedroom. Follow the directions for cleaning the machine. · Do not smoke or allow others to smoke around you. Smoke will make your cough last longer. If you need help quitting, talk to your doctor about stop-smoking programs and medicines. These can increase your chances of quitting for good. · Take an over-the-counter pain medicine, such as acetaminophen (Tylenol), ibuprofen (Advil, Motrin), or naproxen (Aleve). Read and follow all instructions on the label. · Do not take two or more pain medicines at the same time unless the doctor told you to. Many pain medicines have acetaminophen, which is Tylenol. Too much acetaminophen (Tylenol) can be harmful. · If you were given a spirometer to measure how well your lungs are working, use it as instructed.  This can help your doctor tell how your recovery is going. · To prevent pneumonia in the future, talk to your doctor about getting a flu vaccine (once a year) and a pneumococcal vaccine (one time only for most people). When should you call for help? Call 911 anytime you think you may need emergency care. For example, call if:  · You have severe trouble breathing. Call your doctor now or seek immediate medical care if:  · You cough up dark brown or bloody mucus (sputum). · You have new or worse trouble breathing. · You are dizzy or lightheaded, or you feel like you may faint. Watch closely for changes in your health, and be sure to contact your doctor if:  · You have a new or higher fever. · You are coughing more deeply or more often. · You are not getting better after 2 days (48 hours). · You do not get better as expected. Where can you learn more? Go to http://shaheen-thierno.info/. Enter [id number] in the search box to learn more about \"Pneumonia: Care Instructions. \"  Current as of: May 23, 2016  Content Version: 11.1  © 4592-6345 [employer]. Care instructions adapted under license by [employer] (which disclaims liability or warranty for this information). If you have questions about a medical condition or this instruction, always ask your healthcare professional. [place] [address] any warranty or liability for your use of this information. Sepsis: Care Instructions  Your Care Instructions  Sepsis is an infection that has spread throughout your body. It is a life-threatening condition and often causes extremely low blood pressure. This can lead to problems with many different organs. The cause of sepsis is not always clear, but it can happen as part of a long-term or sudden illness. Sometimes even a mild illness can lead to sepsis. Follow-up care is a key part of your treatment and safety.  Be sure to make and go to all appointments, and call your doctor if you are having problems. Its also a good idea to know your test results and keep a list of the medicines you take. How can you care for yourself at home? · If your doctor prescribed antibiotics, take them as directed. Do not stop taking them just because you feel better. You need to take the full course of antibiotics. · Drink plenty of fluids, enough so that your urine is light yellow or clear like water. Choose water or caffeine-free clear liquids until you feel better. If you have kidney, heart, or liver disease and have to limit fluids, talk with your doctor before you increase your fluid intake. You can try rehydration drinks, such as Gatorade or Powerade. · Do not drink alcohol. · Eat a healthy diet. Include fruits, vegetables, and whole grains in your diet every day. · Walking is an easy way to get exercise. Gradually increase the amount you walk every day. Make sure your doctor knows that you are starting an exercise program.  · Do not smoke or use other tobacco products. If you need help quitting, talk to your doctor about stop-smoking programs and medicines. These can increase your chances of quitting for good. When should you call for help? Call 911 anytime you think you may need emergency care. For example, call if:  · You passed out (lost consciousness). Call your doctor now or seek immediate medical care if:  · You have a fever or chills. · You have cool, pale, or clammy skin. · You are dizzy or lightheaded, or you feel like you may faint. · You have any new symptoms, such as a cough, pain in one part of your body, or urinary problems. Watch closely for changes in your health, and be sure to contact your doctor if:  · You do not get better as expected. Where can you learn more? Go to http://shaheen-thierno.info/. Enter X851 in the search box to learn more about \"Sepsis: Care Instructions. \"  Current as of: May 27, 2016  Content Version: 11.1  © 3412-7598 [employer].  Care instructions adapted under license by [employer] (which disclaims liability or warranty for this information). If you have questions about a medical condition or this instruction, always ask your healthcare professional. [place] [address] any warranty or liability for your use of this information. Urinary Tract Infection in Women: Care Instructions  Your Care Instructions    A urinary tract infection, or UTI, is a general term for an infection anywhere between the kidneys and the urethra (where urine comes out). Most UTIs are bladder infections. They often cause pain or burning when you urinate. UTIs are caused by bacteria and can be cured with antibiotics. Be sure to complete your treatment so that the infection goes away. Follow-up care is a key part of your treatment and safety. Be sure to make and go to all appointments, and call your doctor if you are having problems. It's also a good idea to know your test results and keep a list of the medicines you take. How can you care for yourself at home? · Take your antibiotics as directed. Do not stop taking them just because you feel better. You need to take the full course of antibiotics. · Drink extra water and other fluids for the next day or two. This may help wash out the bacteria that are causing the infection. (If you have kidney, heart, or liver disease and have to limit fluids, talk with your doctor before you increase your fluid intake.)  · Avoid drinks that are carbonated or have caffeine. They can irritate the bladder. · Urinate often. Try to empty your bladder each time. · To relieve pain, take a hot bath or lay a heating pad set on low over your lower belly or genital area. Never go to sleep with a heating pad in place. To prevent UTIs  · Drink plenty of water each day. This helps you urinate often, which clears bacteria from your system.  (If you have kidney, heart, or liver disease and have to limit fluids, talk with your doctor before you increase your fluid intake.)  · Consider adding cranberry juice to your diet. · Urinate when you need to. · Urinate right after you have sex. · Change sanitary pads often. · Avoid douches, bubble baths, feminine hygiene sprays, and other feminine hygiene products that have deodorants. · After going to the bathroom, wipe from front to back. When should you call for help? Call your doctor now or seek immediate medical care if:  · Symptoms such as fever, chills, nausea, or vomiting get worse or appear for the first time. · You have new pain in your back just below your rib cage. This is called flank pain. · There is new blood or pus in your urine. · You have any problems with your antibiotic medicine. Watch closely for changes in your health, and be sure to contact your doctor if:  · You are not getting better after taking an antibiotic for 2 days. · Your symptoms go away but then come back. Where can you learn more? Go to http://shaheen-thierno.info/. Enter K273 in the search box to learn more about \"Urinary Tract Infection in Women: Care Instructions. \"  Current as of: August 12, 2016  Content Version: 11.1  © 7897-0544 [employer]. Care instructions adapted under license by [employer] (which disclaims liability or warranty for this information). If you have questions about a medical condition or this instruction, always ask your healthcare professional. [place] [address] any warranty or liability for your use of this information.     DISCHARGE SUMMARY from Nurse    The following personal items are in your possession at time of discharge:    Dental Appliances: None  Visual Aid: None     Home Medications: None  Jewelry: Ring  Clothing: Jacket/Coat, Pajamas  Other Valuables: None             PATIENT INSTRUCTIONS:    After general anesthesia or intravenous sedation, for 24 hours or while taking prescription Narcotics:  · Limit your activities  · Do not drive and operate hazardous machinery  · Do not make important personal or business decisions  · Do  not drink alcoholic beverages  · If you have not urinated within 8 hours after discharge, please contact your surgeon on call. Report the following to your surgeon:  · Excessive pain, swelling, redness or odor of or around the surgical area  · Temperature over 100.5  · Nausea and vomiting lasting longer than 4 hours or if unable to take medications  · Any signs of decreased circulation or nerve impairment to extremity: change in color, persistent  numbness, tingling, coldness or increase pain  · Any questions        What to do at Home:  Recommended activity: Activity as tolerated,       *  Please give a list of your current medications to your Primary Care Provider. *  Please update this list whenever your medications are discontinued, doses are      changed, or new medications (including over-the-counter products) are added. *  Please carry medication information at all times in case of emergency situations. These are general instructions for a healthy lifestyle:    No smoking/ No tobacco products/ Avoid exposure to second hand smoke    Surgeon General's Warning:  Quitting smoking now greatly reduces serious risk to your health. Obesity, smoking, and sedentary lifestyle greatly increases your risk for illness    A healthy diet, regular physical exercise & weight monitoring are important for maintaining a healthy lifestyle    You may be retaining fluid if you have a history of heart failure or if you experience any of the following symptoms:  Weight gain of 3 pounds or more overnight or 5 pounds in a week, increased swelling in our hands or feet or shortness of breath while lying flat in bed. Please call your doctor as soon as you notice any of these symptoms; do not wait until your next office visit.     Recognize signs and symptoms of STROKE:    F-face looks uneven    A-arms unable to move or move unevenly    S-speech slurred or non-existent    T-time-call 911 as soon as signs and symptoms begin-DO NOT go       Back to bed or wait to see if you get better-TIME IS BRAIN. Warning Signs of HEART ATTACK     Call 911 if you have these symptoms:   Chest discomfort. Most heart attacks involve discomfort in the center of the chest that lasts more than a few minutes, or that goes away and comes back. It can feel like uncomfortable pressure, squeezing, fullness, or pain.  Discomfort in other areas of the upper body. Symptoms can include pain or discomfort in one or both arms, the back, neck, jaw, or stomach.  Shortness of breath with or without chest discomfort.  Other signs may include breaking out in a cold sweat, nausea, or lightheadedness. Don't wait more than five minutes to call 911 - MINUTES MATTER! Fast action can save your life. Calling 911 is almost always the fastest way to get lifesaving treatment. Emergency Medical Services staff can begin treatment when they arrive -- up to an hour sooner than if someone gets to the hospital by car. The discharge information has been reviewed with the patient. The patient verbalized understanding. Discharge medications reviewed with the patient and appropriate educational materials and side effects teaching were provided.

## 2017-02-07 NOTE — PROGRESS NOTES
Oxygen Qualifier       Room air: SpO2 with O2 and liter flow   Resting SpO2  90% 92% on 1L   Ambulating SpO2  86% 87%1L, 91%2L       Completed by:    Charlton Carrel, RT

## 2017-02-07 NOTE — PROGRESS NOTES
Faxed order for home 02 to [facility]. A tank will be delivered to the pt's room. There were no other dc needs identified.

## 2017-02-07 NOTE — PROGRESS NOTES
Pt given Norco 7.5mg PO and Ativan 1mg PO per pt request for 5/10 generalized pain and anxiety. Will monitor.

## 2017-02-07 NOTE — PROGRESS NOTES
Shift assessment completed. Pt. Alert and oriented x4. Room air. Doesn't feel well overall.  at bedside. Call light in reach. Instructed to call for assistance.

## 2017-02-07 NOTE — DISCHARGE SUMMARY
Physician Discharge Summary       Patient: Pieter Finney MRN: [id number]  SSN: [id number]    YOB: 1955  Age: 57 y.o. Sex: female    PCP: Yesenia Heredia MD    Admit date: 2/4/2017  Admitting Provider: Nova Galdamez MD    Discharge date: 2/7/2017  Discharging Provider: Harry Nagel MD    * Admission Diagnoses: Sepsis due to urinary tract infection [facility])    * Discharge Diagnoses:    Hospital Problems as of 2/7/2017  Date Reviewed: 11/9/2016          Codes Class Noted - Resolved POA    Pneumonia involving left lung ICD-10-CM: J18.9  ICD-9-CM: 486  2/6/2017 - Present Yes        Renal insufficiency ICD-10-CM: N28.9  ICD-9-CM: 593.9  2/6/2017 - Present Yes        Strep throat ICD-10-CM: J02.0  ICD-9-CM: 034.0  2/6/2017 - Present Yes        Acute respiratory failure with hypoxia [facility]) ICD-10-CM: J96.01  ICD-9-CM: 518.81  2/6/2017 - Present Yes        * (Principal)Sepsis due to urinary tract infection ([facility] [address] ICD-10-CM: A41.9, N39.0  ICD-9-CM: 038.9, 995.91, 599.0  2/4/2017 - Present Unknown              * Hospital Course:     Ms. Frost is a 57year old white female, with a pmh of HTN and recurrent UTIs, who presented 2/4/17 for several days of feeling unwell and a sore throat and is found to have a UTI, ESTELLE, strep throat and multilobar left sided probable gram negative pneumonia. She was started on NS, rocephin and zithromax on admission. She is slowly starting to feel better. Course associated with ESTELLE and creatinine now back to baseline with NS IVFs. Also associated with acute hypoxic respiratory failure, initially requiring 4L NC to maintain appropriate sats. Down now to 1L NC, does not wear oxygen at home but will be sent home with oxygen as she is 86% ambulating on room air. Urine culture growing e coli. She will be changed to oral vantin to complete a ten day course and oral azithromycin to complete an eight day course. She is medically stable for discharge. Discharge Exam:    General: awake, alert, oriented, obese, no acute distress  Eyes; non icteric, EoMI  Neck; supple  CV: RRR  PULM: diminished on left side but no wheezing nor rhonchi, no accessory muscle use  Abd; soft, non tender, non distended, no rebound/guarding    * Discharge Condition: stable  * Disposition: home    Discharge Medications:  Current Discharge Medication List      START taking these medications    Details   azithromycin (ZITHROMAX) 500 mg tab Take 1 Tab by mouth daily for 5 days. Qty: 5 Tab, Refills: 0      cefpodoxime (VANTIN) 200 mg tablet Take 1 Tab by mouth two (2) times a day for 7 days. Qty: 14 Tab, Refills: 0         CONTINUE these medications which have NOT CHANGED    Details   atorvastatin (LIPITOR) 20 mg tablet Take 1 Tab by mouth nightly. Qty: 30 Tab, Refills: 3    Associated Diagnoses: Mixed hyperlipidemia      losartan (COZAAR) 100 mg tablet Take 1 Tab by mouth daily. Qty: 90 Tab, Refills: 3    Associated Diagnoses: Essential hypertension      LORazepam (ATIVAN) 1 mg tablet Take 1 Tab by mouth every eight (8) hours as needed for Anxiety. Qty: 180 Tab, Refills: 1    Associated Diagnoses: Panic disorder      pregabalin (LYRICA) 200 mg capsule Take 1 Cap by mouth three (3) times daily. Max Daily Amount: 600 mg. Qty: 90 Cap, Refills: 5    Associated Diagnoses: Fibromyalgia      DULoxetine (CYMBALTA) 30 mg capsule TAKE THREE CAPSULES BY MOUTH DAILY  Qty: 90 Cap, Refills: 6    Associated Diagnoses: Panic disorder; Fibromyalgia      sotalol (BETAPACE) 160 mg tablet Take 1 Tab by mouth two (2) times a day. Qty: 180 Tab, Refills: 1    Associated Diagnoses: Paroxysmal atrial fibrillation (HCC)      ESOMEPRAZOLE MAGNESIUM (NEXIUM PO) Take  by mouth. Cholecalciferol, Vitamin D3, (VITAMIN D3) 1,000 unit cap Take  by mouth. omega-3 fatty acids-vitamin e (FISH OIL) 1,000 mg cap Take 1 Cap by mouth. tapentadol (NUCYNTA) 100 mg tablet Take  by mouth. HYDROcodone-acetaminophen (NORCO) 7.5-325 mg per tablet Take 1 Tab by mouth every eight (8) hours as needed. Per anesthesia protocol:instructed to take am of surgery if needed      cyanocobalamin (VITAMIN B-12) 1,000 mcg Subl 1 Tab by SubLINGual route every morning. STOP taking these medications       meloxicam (MOBIC) 15 mg tablet Comments:   Reason for Stopping:               * Follow-up Care/Patient Instructions: Activity: as tolerated  Diet: low salt    Follow-up Information     Follow up With Details Comments Natasha Ventura MD Go on 2/23/2017 2:30pm [address] [place] [address]  [phone]          35 minutes spent in discharge planning and coordination of care.      Signed:  Lili Kohler MD  2/7/2017  10:13 AM

## 2017-02-07 NOTE — PROGRESS NOTES
Pt. Left on room air per Dr. David Yen. When reassessing, O2 is 83% on room air. Makeda Nash for pt. to return to 94%. Will monitor.

## 2017-02-09 LAB
BACTERIA SPEC CULT: NORMAL
BACTERIA SPEC CULT: NORMAL
SERVICE CMNT-IMP: NORMAL
SERVICE CMNT-IMP: NORMAL

## 2017-02-16 PROBLEM — N28.9 RENAL INSUFFICIENCY: Status: RESOLVED | Noted: 2017-02-06 | Resolved: 2017-02-16

## 2017-05-10 PROBLEM — A41.9 SEPSIS DUE TO URINARY TRACT INFECTION (HCC): Status: RESOLVED | Noted: 2017-02-04 | Resolved: 2017-01-01

## 2017-05-10 PROBLEM — N39.0 SEPSIS DUE TO URINARY TRACT INFECTION (HCC): Status: RESOLVED | Noted: 2017-02-04 | Resolved: 2017-01-01

## 2017-05-10 PROBLEM — J96.01 ACUTE RESPIRATORY FAILURE WITH HYPOXIA (HCC): Status: RESOLVED | Noted: 2017-02-06 | Resolved: 2017-01-01

## 2017-09-05 PROBLEM — L98.8 OTHER SPECIFIED DISORDERS OF THE SKIN AND SUBCUTANEOUS TISSUE: Status: ACTIVE | Noted: 2017-01-01

## 2017-09-05 PROBLEM — L81.4 OTHER MELANIN HYPERPIGMENTATION: Status: ACTIVE | Noted: 2017-01-01

## 2017-09-05 PROBLEM — L82.1 OTHER SEBORRHEIC KERATOSIS: Status: ACTIVE | Noted: 2017-01-01

## 2017-09-05 PROBLEM — D48.5 NEOPLASM OF UNCERTAIN BEHAVIOR OF SKIN: Status: ACTIVE | Noted: 2017-01-01

## 2017-09-05 PROBLEM — Z85.828 PERSONAL HISTORY OF OTHER MALIGNANT NEOPLASM OF SKIN: Status: ACTIVE | Noted: 2017-01-01

## 2017-09-05 PROBLEM — I10 ESSENTIAL (PRIMARY) HYPERTENSION: Status: ACTIVE | Noted: 2017-01-01

## 2017-09-05 NOTE — PROCEDURE: BIOPSY BY SHAVE METHOD
Destruction After The Procedure: No
Billing Type: Third-Party Bill
Wound Care: Petrolatum
Consent: Written consent was obtained and risks were reviewed including but not limited to scarring, infection, bleeding, scabbing, incomplete removal, nerve damage and allergy to anesthesia.
Anesthesia Volume In Cc: 0.5
Cryotherapy Text: The wound bed was treated with cryotherapy after the biopsy was performed.
Silver Nitrate Text: The wound bed was treated with silver nitrate after the biopsy was performed.
Dressing: bandage
Additional Anesthesia Volume In Cc (Will Not Render If 0): 0
Post-Care Instructions: I reviewed with the patient in detail post-care instructions. Patient is to keep the biopsy site dry overnight, and then apply bacitracin twice daily until healed. Patient may apply hydrogen peroxide soaks to remove any crusting.
Path Notes (To The Dermatopathologist): BCC
Notification Instructions: Patient will be notified of biopsy results. However, patient instructed to call the office if not contacted within 2 weeks.
Biopsy Type: H and E
Hemostasis: Electrocautery
Size Of Lesion In Cm: 1.1
Biopsy Method: Dermablade
Electrodesiccation Text: The wound bed was treated with electrodesiccation after the biopsy was performed.
Type Of Destruction Used: Curettage
Detail Level: Detailed
Electrodesiccation And Curettage Text: The wound bed was treated with electrodesiccation and curettage after the biopsy was performed.
Anesthesia Type: 1% lidocaine with epinephrine

## 2017-10-10 PROBLEM — D37.01 NEOPLASM OF UNCERTAIN BEHAVIOR OF LIP: Status: ACTIVE | Noted: 2017-01-01

## 2017-10-10 PROBLEM — L70.0 ACNE VULGARIS: Status: ACTIVE | Noted: 2017-01-01

## 2017-10-10 PROBLEM — J30.1 ALLERGIC RHINITIS DUE TO POLLEN: Status: ACTIVE | Noted: 2017-01-01

## 2017-10-10 PROBLEM — C44.719 BASAL CELL CARCINOMA OF SKIN OF LEFT LOWER LIMB, INCLUDING HIP: Status: ACTIVE | Noted: 2017-01-01

## 2017-10-10 NOTE — PROCEDURE: BIOPSY BY PUNCH METHOD
Bill For Surgical Tray: no
Home Suture Removal Text: Patient was provided a home suture removal kit and will remove their sutures at home.  If they have any questions or difficulties they will call the office.
Anesthesia Type: 1% lidocaine with epinephrine
Biopsy Type: H and E
Wound Care: Petrolatum
Dressing: pressure dressing with telfa
Anesthesia Volume In Cc: 0.4
Size Of Lesion In Cm (Optional): 0
Hemostasis: Aluminum Chloride and Electrocautery
Punch Size In Mm: 2
Notification Instructions: Patient will be notified of biopsy results. However, patient instructed to call the office if not contacted within 2 weeks.
Post-Care Instructions: I reviewed with the patient in detail post-care instructions. Patient is to keep the biopsy site dry and bandaged overnight, and then apply bacitracin, polysporin, or vasoline (if any allergies to previous topical antibiotics) twice daily until healed. Patient may apply hydrogen peroxide soaks to remove any crusting.
Epidermal Sutures: none, closed by secondary intention
Billing Type: Third-Party Bill
Consent: Written consent was obtained and risks were reviewed including but not limited to scarring, infection, bleeding, scabbing, incomplete removal, nerve damage and allergy to anesthesia.
Detail Level: Detailed

## 2017-10-10 NOTE — PROCEDURE: CURETTAGE AND DESTRUCTION
Body Location Override (Optional - Billing Will Still Be Based On Selected Body Map Location If Applicable): left distal calf
Consent was obtained from the patient. The risks, benefits and alternatives to therapy were discussed in detail. Specifically, the risks of infection, scarring, bleeding, prolonged wound healing, nerve injury, incomplete removal, allergy to anesthesia and recurrence were addressed. Prior to the procedure, the treatment site was clearly identified and confirmed by the patient.
Cautery Type: aluminum chloride
Post-Care Instructions: I reviewed with the patient in detail post-care instructions. Patient is to keep the area dry for 48 hours, and not to engage in any swimming until the area is healed. Should the patient develop any fevers, chills, bleeding, severe pain patient will contact the office immediately.
Anesthesia Type: 1% lidocaine with epinephrine
Bill For Surgical Tray: no
Detail Level: Detailed
Size Of Lesion In Cm: 1.1
Bill As A Line Item Or As Units: Line Item
What Was Performed First?: Curettage
Number Of Curettages: 3
Additional Information: (Optional): The wound was cleaned, and a pressure dressing was applied.  The patient received detailed post-op instructions.

## 2017-11-10 PROBLEM — J18.9 PNEUMONIA INVOLVING LEFT LUNG: Status: RESOLVED | Noted: 2017-02-06 | Resolved: 2017-01-01

## 2017-11-10 PROBLEM — J02.0 STREP THROAT: Status: RESOLVED | Noted: 2017-02-06 | Resolved: 2017-01-01

## 2018-01-01 ENCOUNTER — APPOINTMENT (OUTPATIENT)
Dept: CT IMAGING | Age: 63
End: 2018-01-01
Attending: EMERGENCY MEDICINE
Payer: MEDICARE

## 2018-01-01 ENCOUNTER — HOSPITAL ENCOUNTER (EMERGENCY)
Age: 63
End: 2018-04-08
Attending: EMERGENCY MEDICINE
Payer: MEDICARE

## 2018-01-01 ENCOUNTER — APPOINTMENT (OUTPATIENT)
Dept: GENERAL RADIOLOGY | Age: 63
End: 2018-01-01
Attending: EMERGENCY MEDICINE
Payer: MEDICARE

## 2018-01-01 VITALS
BODY MASS INDEX: 41.14 KG/M2 | WEIGHT: 256 LBS | DIASTOLIC BLOOD PRESSURE: 77 MMHG | HEIGHT: 66 IN | RESPIRATION RATE: 15 BRPM | SYSTOLIC BLOOD PRESSURE: 117 MMHG | HEART RATE: 95 BPM | OXYGEN SATURATION: 89 %

## 2018-01-01 DIAGNOSIS — I46.9 CARDIAC ARREST (HCC): Primary | ICD-10-CM

## 2018-01-01 LAB
ALBUMIN SERPL-MCNC: 3.2 G/DL (ref 3.2–4.6)
ALBUMIN/GLOB SERPL: 0.8 {RATIO} (ref 1.2–3.5)
ALP SERPL-CCNC: 79 U/L (ref 50–136)
ALT SERPL-CCNC: 44 U/L (ref 12–65)
ANION GAP SERPL CALC-SCNC: 10 MMOL/L (ref 7–16)
APTT PPP: 25 SEC (ref 23.2–35.3)
ARTERIAL PATENCY WRIST A: ABNORMAL
AST SERPL-CCNC: 71 U/L (ref 15–37)
ATRIAL RATE: 138 BPM
BASE DEFICIT BLDV-SCNC: 11.2 MMOL/L
BASOPHILS # BLD: 0 K/UL (ref 0–0.2)
BASOPHILS NFR BLD: 0 % (ref 0–2)
BDY SITE: ABNORMAL
BILIRUB SERPL-MCNC: 0.5 MG/DL (ref 0.2–1.1)
BUN SERPL-MCNC: 33 MG/DL (ref 8–23)
CALCIUM SERPL-MCNC: 9.3 MG/DL (ref 8.3–10.4)
CALCULATED R AXIS, ECG10: 79 DEGREES
CALCULATED T AXIS, ECG11: -21 DEGREES
CHLORIDE SERPL-SCNC: 108 MMOL/L (ref 98–107)
CO2 SERPL-SCNC: 26 MMOL/L (ref 21–32)
COHGB MFR BLD: 0.3 % (ref 0.5–1.5)
CREAT SERPL-MCNC: 1.28 MG/DL (ref 0.6–1)
DIAGNOSIS, 93000: NORMAL
DIFFERENTIAL METHOD BLD: ABNORMAL
DO-HGB BLD-MCNC: 87 % (ref 0–5)
EOSINOPHIL # BLD: 0.1 K/UL (ref 0–0.8)
EOSINOPHIL NFR BLD: 0 % (ref 0.5–7.8)
ERYTHROCYTE [DISTWIDTH] IN BLOOD BY AUTOMATED COUNT: 13.6 % (ref 11.9–14.6)
ETHANOL SERPL-MCNC: <3 MG/DL
FIO2 ON VENT: 100 %
GLOBULIN SER CALC-MCNC: 4.1 G/DL (ref 2.3–3.5)
GLUCOSE BLD STRIP.AUTO-MCNC: 181 MG/DL (ref 65–100)
GLUCOSE SERPL-MCNC: 155 MG/DL (ref 65–100)
HCO3 BLDV-SCNC: 21 MMOL/L
HCT VFR BLD AUTO: 40 % (ref 35.8–46.3)
HGB BLD-MCNC: 12.9 G/DL (ref 11.7–15.4)
HGB BLDMV-MCNC: 12.9 GM/DL (ref 11.7–15)
IMM GRANULOCYTES # BLD: 0.1 K/UL (ref 0–0.5)
IMM GRANULOCYTES NFR BLD AUTO: 1 % (ref 0–5)
INR PPP: 1.1
LACTATE BLD-SCNC: 6.1 MMOL/L (ref 0.5–1.9)
LYMPHOCYTES # BLD: 7.3 K/UL (ref 0.5–4.6)
LYMPHOCYTES NFR BLD: 37 % (ref 13–44)
MCH RBC QN AUTO: 29.8 PG (ref 26.1–32.9)
MCHC RBC AUTO-ENTMCNC: 32.3 G/DL (ref 31.4–35)
MCV RBC AUTO: 92.4 FL (ref 79.6–97.8)
METHGB MFR BLD: 2 % (ref 0–1.5)
MONOCYTES # BLD: 1.4 K/UL (ref 0.1–1.3)
MONOCYTES NFR BLD: 7 % (ref 4–12)
NEUTS SEG # BLD: 10.7 K/UL (ref 1.7–8.2)
NEUTS SEG NFR BLD: 55 % (ref 43–78)
OXYHGB MFR BLDV: 10.3 %
PCO2 BLDV: 81.1 MMHG
PH BLDV: 7.03 [PH]
PLATELET # BLD AUTO: 156 K/UL (ref 150–450)
PMV BLD AUTO: 11.8 FL (ref 10.8–14.1)
PO2 BLDV: <34 MMHG
POTASSIUM SERPL-SCNC: 4.2 MMOL/L (ref 3.5–5.1)
PROT SERPL-MCNC: 7.3 G/DL (ref 6.3–8.2)
PROTHROMBIN TIME: 13.8 SEC (ref 11.5–14.5)
Q-T INTERVAL, ECG07: 484 MS
QRS DURATION, ECG06: 96 MS
QTC CALCULATION (BEZET), ECG08: 484 MS
RBC # BLD AUTO: 4.33 M/UL (ref 4.05–5.25)
SERVICE CMNT-IMP: ABNORMAL
SODIUM SERPL-SCNC: 144 MMOL/L (ref 136–145)
TROPONIN I BLD-MCNC: 0.51 NG/ML (ref 0.02–0.05)
VENTILATION MODE VENT: ABNORMAL
VENTRICULAR RATE, ECG03: 60 BPM
WBC # BLD AUTO: 19.6 K/UL (ref 4.3–11.1)

## 2018-01-01 PROCEDURE — 96374 THER/PROPH/DIAG INJ IV PUSH: CPT | Performed by: EMERGENCY MEDICINE

## 2018-01-01 PROCEDURE — 83605 ASSAY OF LACTIC ACID: CPT

## 2018-01-01 PROCEDURE — 31500 INSERT EMERGENCY AIRWAY: CPT | Performed by: EMERGENCY MEDICINE

## 2018-01-01 PROCEDURE — 82962 GLUCOSE BLOOD TEST: CPT

## 2018-01-01 PROCEDURE — 82803 BLOOD GASES ANY COMBINATION: CPT

## 2018-01-01 PROCEDURE — 93005 ELECTROCARDIOGRAM TRACING: CPT | Performed by: EMERGENCY MEDICINE

## 2018-01-01 PROCEDURE — 85730 THROMBOPLASTIN TIME PARTIAL: CPT | Performed by: EMERGENCY MEDICINE

## 2018-01-01 PROCEDURE — 74011000250 HC RX REV CODE- 250: Performed by: EMERGENCY MEDICINE

## 2018-01-01 PROCEDURE — 92950 HEART/LUNG RESUSCITATION CPR: CPT | Performed by: EMERGENCY MEDICINE

## 2018-01-01 PROCEDURE — 80053 COMPREHEN METABOLIC PANEL: CPT | Performed by: EMERGENCY MEDICINE

## 2018-01-01 PROCEDURE — 85025 COMPLETE CBC W/AUTO DIFF WBC: CPT | Performed by: EMERGENCY MEDICINE

## 2018-01-01 PROCEDURE — 99285 EMERGENCY DEPT VISIT HI MDM: CPT | Performed by: EMERGENCY MEDICINE

## 2018-01-01 PROCEDURE — 96375 TX/PRO/DX INJ NEW DRUG ADDON: CPT | Performed by: EMERGENCY MEDICINE

## 2018-01-01 PROCEDURE — 74011250636 HC RX REV CODE- 250/636: Performed by: EMERGENCY MEDICINE

## 2018-01-01 PROCEDURE — 85610 PROTHROMBIN TIME: CPT | Performed by: EMERGENCY MEDICINE

## 2018-01-01 PROCEDURE — 84484 ASSAY OF TROPONIN QUANT: CPT

## 2018-01-01 PROCEDURE — 71045 X-RAY EXAM CHEST 1 VIEW: CPT

## 2018-01-01 PROCEDURE — 80307 DRUG TEST PRSMV CHEM ANLYZR: CPT | Performed by: EMERGENCY MEDICINE

## 2018-01-01 RX ORDER — EPINEPHRINE 0.1 MG/ML
INJECTION INTRACARDIAC; INTRAVENOUS
Status: COMPLETED | OUTPATIENT
Start: 2018-01-01 | End: 2018-01-01

## 2018-01-01 RX ORDER — NALOXONE HYDROCHLORIDE 0.4 MG/ML
INJECTION, SOLUTION INTRAMUSCULAR; INTRAVENOUS; SUBCUTANEOUS
Status: COMPLETED | OUTPATIENT
Start: 2018-01-01 | End: 2018-01-01

## 2018-01-01 RX ORDER — ATROPINE SULFATE 0.1 MG/ML
INJECTION INTRAVENOUS
Status: COMPLETED | OUTPATIENT
Start: 2018-01-01 | End: 2018-01-01

## 2018-01-01 RX ORDER — NOREPINEPHRINE BIT/0.9 % NACL 4MG/250ML
PLASTIC BAG, INJECTION (ML) INTRAVENOUS
Status: DISCONTINUED
Start: 2018-01-01 | End: 2018-01-01 | Stop reason: HOSPADM

## 2018-01-01 RX ORDER — NALOXONE HYDROCHLORIDE 0.4 MG/ML
INJECTION, SOLUTION INTRAMUSCULAR; INTRAVENOUS; SUBCUTANEOUS
Status: DISCONTINUED
Start: 2018-01-01 | End: 2018-01-01 | Stop reason: HOSPADM

## 2018-01-01 RX ORDER — SODIUM BICARBONATE 1 MEQ/ML
SYRINGE (ML) INTRAVENOUS
Status: COMPLETED | OUTPATIENT
Start: 2018-01-01 | End: 2018-01-01

## 2018-01-01 RX ADMIN — SODIUM BICARBONATE 50 MEQ: 84 INJECTION, SOLUTION INTRAVENOUS at 11:46

## 2018-01-01 RX ADMIN — NALOXONE HYDROCHLORIDE 0.4 MG: 0.4 INJECTION, SOLUTION INTRAMUSCULAR; INTRAVENOUS; SUBCUTANEOUS at 11:25

## 2018-01-01 RX ADMIN — EPINEPHRINE 1 MG: 0.1 INJECTION, SOLUTION ENDOTRACHEAL; INTRACARDIAC; INTRAVENOUS at 11:57

## 2018-01-01 RX ADMIN — EPINEPHRINE 1 MG: 0.1 INJECTION, SOLUTION ENDOTRACHEAL; INTRACARDIAC; INTRAVENOUS at 11:48

## 2018-01-01 RX ADMIN — ATROPINE SULFATE 1 MG: 0.1 INJECTION, SOLUTION INTRAVENOUS at 11:34

## 2018-01-01 RX ADMIN — SODIUM BICARBONATE 50 MEQ: 84 INJECTION, SOLUTION INTRAVENOUS at 11:53

## 2018-01-01 RX ADMIN — EPINEPHRINE 1 MG: 0.1 INJECTION, SOLUTION ENDOTRACHEAL; INTRACARDIAC; INTRAVENOUS at 11:36

## 2018-01-01 RX ADMIN — EPINEPHRINE 1 MG: 0.1 INJECTION, SOLUTION ENDOTRACHEAL; INTRACARDIAC; INTRAVENOUS at 11:26

## 2018-01-01 RX ADMIN — EPINEPHRINE 1 MG: 0.1 INJECTION, SOLUTION ENDOTRACHEAL; INTRACARDIAC; INTRAVENOUS at 11:41

## 2018-02-09 PROBLEM — E66.01 OBESITY, MORBID (HCC): Status: ACTIVE | Noted: 2018-01-01

## 2018-04-08 NOTE — ED NOTES
Axel Alex from the  home arrived, pt taken to  home at this time. Family left with pt's medications and any personal items that were not on the patient.

## 2018-04-08 NOTE — PROGRESS NOTES
Patient was intubated with a number 7.5 ET Tube. Tube placement verified by auscultation. ET Tube is secured at the 23 cm [place] at the lip and on the bilateral side. Patient was intubated by Dr Giovana Guidry on the 2 attempt.

## 2018-04-08 NOTE — ED PROVIDER NOTES
HPI Comments: Patient is a 68-year-old female with history of hypertension and atrial fibrillation and some chronic pain who is brought to the emergency department today by EMS for reported weakness. Initial history was obtained from the paramedics. They state that she initially had a low blood pressure around 90 but gave IV fluids and some Zofran for nausea when they were entering the ER they state that she went unresponsive and was pale and staring to the left. There was no witnessed tonic-clonic seizure activity. I was called to see the patient immediately by the nursing staff. She was minimally responsive and very pale she was hypotensive. We placed her on the monitor and stimulated her she did respond and talk and have a weak gag reflex. She was placed on supplemental oxygen and we started some testing. We're awaiting family to arrive to provide additional information. She was then noted to be more bradycardic and we lost her pulse. She was intubated by me family arrived later and states that for she was diagnosed with bursitis a few days ago and was placed on prednisone they state that she has been in pain and laying around more the last few days last night she told her  that she did not feel well and wanted him to check her pulse CC said it was very weak and slow. Patient is a 70 y.o. female presenting with fatigue. The history is provided by the patient, the EMS personnel, the spouse and a relative. Fatigue          Past Medical History:   Diagnosis Date    Arrhythmia 2009    Hx of A Fib, s/p ablation for treatment    Arthritis     osteo knees    Atrial fibrillation ([facility] [place] [address] 4/15/2013    :Hx of successful atrial fibrillation and atrial flutter ablation on2-7-2011. Patient is chronic Sotalol therapy.     Autoimmune disease ([facility] [address]     fibrmyalgia    Chronic pain     d/t fibromyalgia    Dyspepsia 4/15/2013    Fibromyalgia 4/15/2013    Hyperlipidemia 4/15/2013    Hypertension controlled by medication    Nausea & vomiting     Obesity     BMI=42    Obesity     Panic disorder 4/15/2013    Psychiatric disorder     anxiety controlled by medication       Past Surgical History:   Procedure Laterality Date    CARDIAC SURG PROCEDURE UNLIST  2/8/2011    ablation    HX COLONOSCOPY  12/08/2016    ; repeat 10years    HX HEART CATHETERIZATION  07/01/2008    Normal cardiac cath. Normal myocardial perfusion on 5-.  HX KNEE ARTHROSCOPY  2008    left meniscus tear repair    HX LAP CHOLECYSTECTOMY  2003    HX TONSILLECTOMY  1976    HX TUBAL LIGATION  1988         Family History:   Problem Relation Age of Onset    Heart Disease Mother     Cancer Father      prostate    Cancer Brother      brain tumor    Cancer Brother      brain tumor    Cancer Brother      pancreatic    Cancer Sister      breast    Breast Cancer Sister 79       Social History     Social History    Marital status:      Spouse name: N/A    Number of children: N/A    Years of education: N/A     Occupational History    Not on file. Social History Main Topics    Smoking status: Never Smoker    Smokeless tobacco: Never Used    Alcohol use No    Drug use: No    Sexual activity: Not on file     Other Topics Concern    Not on file     Social History Narrative         ALLERGIES: Ace inhibitors; Augmentin [amoxicillin-pot clavulanate]; Lipitor [atorvastatin]; Reglan [metoclopramide]; and Trilipix [fenofibric acid (choline)]    Review of Systems   Unable to perform ROS: Acuity of condition   Constitutional: Positive for fatigue. Vitals:    04/08/18 1108   BP: 117/77   Pulse: 95   Resp: 15   SpO2: (!) 89%   Weight: 116.1 kg (256 lb)   Height: 5' 6\" (1.676 m)            Physical Exam   Constitutional: She appears distressed. Overweight pale and critically ill   HENT:   Head: Normocephalic and atraumatic. Eyes:   Dilated pupils bilaterally   Cardiovascular: Bradycardia present. Pulmonary/Chest: Breath sounds normal.   Abdominal: Soft. There is no tenderness. Skin: There is pallor. Nursing note and vitals reviewed. MDM  Number of Diagnoses or Management Options  Diagnosis management comments: Differential diagnosis includes sepsis, UTI, pneumonia, myocardial infarction, arrhythmia, pulmonary embolism       Amount and/or Complexity of Data Reviewed  Clinical lab tests: ordered and reviewed  Tests in the radiology section of CPT®: ordered and reviewed    Risk of Complications, Morbidity, and/or Mortality  Presenting problems: high  Diagnostic procedures: high  Management options: high    Critical Care  Total time providing critical care:  minutes    Patient Progress  Patient progress: other (comment)        ED Course   shortly after arrival here in the ER she became unresponsive and bradycardic and made the decision to intubate. She then was completely unresponsive and did not require any premedication. We then had to start CPR and gave a dose of epinephrine. Family arrived and I met with them several times obtaining additional history of her weakness for the past several days. Over the next hour we were intermittently performing CPR she wound up receiving approximately 6 or 7 doses of epinephrine and a dose of atropine multiple points of bicarbonate all within no relief. She had no response to Narcan and her blood sugar was normal here. Her family was involved during the CPR and they wanted to continue until her other son arrived. He eventually arrived and she became bradycardic and pulseless again all left which were ceased as we had exhausted all possible measures. 1:08 PM  CPR was continued at the family's request until her other son could arrive.   Eventually he arrived and family was at the bedside with the patient when she became bradycardic and lost her pulse again time of death was called at 12:16 PM    Voice dictation software was used during the making of this note. This software is not perfect and grammatical and other typographical errors may be present. This note has been proofread, but may still contain errors. Sawyer Cespedes MD; 4/8/2018 @1:08 PM   ===================================================================      ===================================================================  This patient is critically ill and there is a high probability of of imminent or life threatening deterioration in the patient's condition without immediate management. The nature of the patient's clinical problem is:Cardiac arrest    I have spent 75 minutes in direct patient care, documentation, review of labs/xrays/old records, discussion with patient and family . The time involved in the performance of separately reportable procedures was not counted toward critical care time. Sawyer Cespedes MD; 4/8/2018 @1:05 PM  ===================================================================          Intubation  Date/Time: 4/8/2018 11:56 AM  Performed by: Jorge Toledo  Authorized by: Jorge Toledo     Consent:     Consent obtained:  Emergent situation  Pre-procedure details:     Patient status:  Unresponsive    Mallampati score:  II    Pretreatment medications:  None    Paralytics:  None  Procedure details:     Preoxygenation:  Bag valve mask    CPR in progress: no      Intubation method:  Oral    Oral intubation technique:  Direct    Laryngoscope blade:   Mac 3    Tube size (mm):  7.5    Tube type:  Cuffed    Number of attempts:  2    Ventilation between attempts: yes      Cricoid pressure: yes      Tube visualized through cords: yes    Placement assessment:     ETT to lip:  23    Tube secured with:  ETT zendejas    Breath sounds:  Equal    Placement verification: chest rise, condensation, CXR verification, direct visualization, equal breath sounds and ETCO2 detector      CXR findings:  ETT in proper place  Post-procedure details:     Patient tolerance of procedure:   Tolerated well, no immediate complications

## 2018-04-08 NOTE — ED TRIAGE NOTES
Pt arrived via EMS with c/o bedridden X2 days d/t pain in hip and weakness. Pt arrived via EMS and was non-verbal, staring at the ceiling, unresponsive. JPFX=316, was given 500ml of fluid and zofran 4mg IV.

## 2018-04-08 NOTE — ED NOTES
Pt intubated at 1124, narcan given at 1125, Pt asystole at 1126 compressions started. Radial pulse present at 1128, heart rate continues to decelerate and atropine given at 1134. Aceves placed at 1135, no urine output noted. 1136 pt without pulse, compressions resumed. 1137 carotid pulse felt. 1141 pt in PEA, compressions resumed. 1143 pulse noted compressions held. Attempted manual BP, unable to auscultate. 1148 no pulse, compressions resumed. 1153 Bicarb given and /73 on monitor. 1157 no pulse, compressions resumed. 1200 compressions held, faint pulse, family in the room per their request. 1203 no pulse, compressions restarted. 1206 compressions held, pulse 98. Pt's heartrate started to decel, Dr. Sherin Velazco discussed with family and decision made to hold off on medications, pt without pulse again, compressions only provided per charting. 5 pt's family at the bedside with Dr. Sherin Velazco and decision made to not resume compressions when pulse no longer felt and pt pronounced dead by Dr. Sherin Velazco.

## 2018-04-08 NOTE — PROGRESS NOTES
Comforted family thru end of life and death  Prayer    Anibal Van, staff Danya hester [address], [address]  /   [email or web address]